# Patient Record
Sex: FEMALE | Race: BLACK OR AFRICAN AMERICAN | NOT HISPANIC OR LATINO
[De-identification: names, ages, dates, MRNs, and addresses within clinical notes are randomized per-mention and may not be internally consistent; named-entity substitution may affect disease eponyms.]

---

## 2017-04-14 ENCOUNTER — OTHER (OUTPATIENT)
Age: 42
End: 2017-04-14

## 2018-03-09 ENCOUNTER — TRANSCRIPTION ENCOUNTER (OUTPATIENT)
Age: 43
End: 2018-03-09

## 2020-05-10 ENCOUNTER — INPATIENT (INPATIENT)
Facility: HOSPITAL | Age: 45
LOS: 4 days | Discharge: HOME | DRG: 812 | End: 2020-05-15
Attending: FAMILY MEDICINE | Admitting: STUDENT IN AN ORGANIZED HEALTH CARE EDUCATION/TRAINING PROGRAM
Payer: COMMERCIAL

## 2020-05-10 VITALS
OXYGEN SATURATION: 97 % | RESPIRATION RATE: 18 BRPM | WEIGHT: 220.02 LBS | SYSTOLIC BLOOD PRESSURE: 159 MMHG | TEMPERATURE: 98 F | HEIGHT: 69 IN | DIASTOLIC BLOOD PRESSURE: 93 MMHG | HEART RATE: 89 BPM

## 2020-05-10 LAB
HCT VFR BLD CALC: 21.6 % — LOW (ref 34.5–45)
HGB BLD-MCNC: 7.5 G/DL — LOW (ref 11.5–15.5)
MCHC RBC-ENTMCNC: 32.9 PG — SIGNIFICANT CHANGE UP (ref 27–34)
MCHC RBC-ENTMCNC: 34.7 GM/DL — SIGNIFICANT CHANGE UP (ref 32–36)
MCV RBC AUTO: 94.7 FL — SIGNIFICANT CHANGE UP (ref 80–100)
PLATELET # BLD AUTO: 473 K/UL — HIGH (ref 150–400)
RBC # BLD: 2.28 M/UL — LOW (ref 3.8–5.2)
RBC # BLD: 2.28 M/UL — LOW (ref 3.8–5.2)
RBC # FLD: 21.8 % — HIGH (ref 10.3–14.5)
WBC # BLD: 16.33 K/UL — HIGH (ref 3.8–10.5)
WBC # FLD AUTO: 16.33 K/UL — HIGH (ref 3.8–10.5)

## 2020-05-10 RX ORDER — MORPHINE SULFATE 50 MG/1
4 CAPSULE, EXTENDED RELEASE ORAL ONCE
Refills: 0 | Status: DISCONTINUED | OUTPATIENT
Start: 2020-05-10 | End: 2020-05-10

## 2020-05-10 RX ORDER — ONDANSETRON 8 MG/1
4 TABLET, FILM COATED ORAL ONCE
Refills: 0 | Status: COMPLETED | OUTPATIENT
Start: 2020-05-10 | End: 2020-05-10

## 2020-05-10 RX ORDER — SODIUM CHLORIDE 9 MG/ML
1000 INJECTION INTRAMUSCULAR; INTRAVENOUS; SUBCUTANEOUS ONCE
Refills: 0 | Status: COMPLETED | OUTPATIENT
Start: 2020-05-10 | End: 2020-05-10

## 2020-05-10 RX ADMIN — ONDANSETRON 4 MILLIGRAM(S): 8 TABLET, FILM COATED ORAL at 23:48

## 2020-05-10 RX ADMIN — SODIUM CHLORIDE 1000 MILLILITER(S): 9 INJECTION INTRAMUSCULAR; INTRAVENOUS; SUBCUTANEOUS at 23:48

## 2020-05-10 RX ADMIN — MORPHINE SULFATE 4 MILLIGRAM(S): 50 CAPSULE, EXTENDED RELEASE ORAL at 23:48

## 2020-05-10 NOTE — ED PROVIDER NOTE - PMH
Cellulitis left arm resolved 9/2010    Cerebral Aneurysm    History of Cholecystectomy    Migraines    Sickle Cell Anemia

## 2020-05-10 NOTE — ED PROVIDER NOTE - OBJECTIVE STATEMENT
45yo female who presents with ss crisis. pt states it started tonite with back and rib pain, no cough, fever, chills, pt took 2 percocet at 8pm with no relief.

## 2020-05-11 DIAGNOSIS — Z90.49 ACQUIRED ABSENCE OF OTHER SPECIFIED PARTS OF DIGESTIVE TRACT: Chronic | ICD-10-CM

## 2020-05-11 DIAGNOSIS — M87.052 IDIOPATHIC ASEPTIC NECROSIS OF LEFT FEMUR: ICD-10-CM

## 2020-05-11 DIAGNOSIS — Z98.891 HISTORY OF UTERINE SCAR FROM PREVIOUS SURGERY: Chronic | ICD-10-CM

## 2020-05-11 DIAGNOSIS — D57.00 HB-SS DISEASE WITH CRISIS, UNSPECIFIED: ICD-10-CM

## 2020-05-11 DIAGNOSIS — I67.1 CEREBRAL ANEURYSM, NONRUPTURED: ICD-10-CM

## 2020-05-11 DIAGNOSIS — Z29.9 ENCOUNTER FOR PROPHYLACTIC MEASURES, UNSPECIFIED: ICD-10-CM

## 2020-05-11 DIAGNOSIS — G43.909 MIGRAINE, UNSPECIFIED, NOT INTRACTABLE, WITHOUT STATUS MIGRAINOSUS: ICD-10-CM

## 2020-05-11 DIAGNOSIS — I51.7 CARDIOMEGALY: ICD-10-CM

## 2020-05-11 LAB
ALBUMIN SERPL ELPH-MCNC: 3.7 G/DL — SIGNIFICANT CHANGE UP (ref 3.3–5)
ALBUMIN SERPL ELPH-MCNC: 3.8 G/DL — SIGNIFICANT CHANGE UP (ref 3.3–5)
ALP SERPL-CCNC: 88 U/L — SIGNIFICANT CHANGE UP (ref 40–120)
ALP SERPL-CCNC: 89 U/L — SIGNIFICANT CHANGE UP (ref 40–120)
ALT FLD-CCNC: 28 U/L — SIGNIFICANT CHANGE UP (ref 12–78)
ALT FLD-CCNC: 30 U/L — SIGNIFICANT CHANGE UP (ref 12–78)
ANION GAP SERPL CALC-SCNC: 7 MMOL/L — SIGNIFICANT CHANGE UP (ref 5–17)
ANION GAP SERPL CALC-SCNC: 7 MMOL/L — SIGNIFICANT CHANGE UP (ref 5–17)
ANISOCYTOSIS BLD QL: SIGNIFICANT CHANGE UP
AST SERPL-CCNC: 42 U/L — HIGH (ref 15–37)
AST SERPL-CCNC: 49 U/L — HIGH (ref 15–37)
BASOPHILS # BLD AUTO: 0 K/UL — SIGNIFICANT CHANGE UP (ref 0–0.2)
BASOPHILS # BLD AUTO: 0.16 K/UL — SIGNIFICANT CHANGE UP (ref 0–0.2)
BASOPHILS NFR BLD AUTO: 0 % — SIGNIFICANT CHANGE UP (ref 0–2)
BASOPHILS NFR BLD AUTO: 0.9 % — SIGNIFICANT CHANGE UP (ref 0–2)
BILIRUB SERPL-MCNC: 2.3 MG/DL — HIGH (ref 0.2–1.2)
BILIRUB SERPL-MCNC: 2.9 MG/DL — HIGH (ref 0.2–1.2)
BUN SERPL-MCNC: 5 MG/DL — LOW (ref 7–23)
BUN SERPL-MCNC: 6 MG/DL — LOW (ref 7–23)
CALCIUM SERPL-MCNC: 8.3 MG/DL — LOW (ref 8.5–10.1)
CALCIUM SERPL-MCNC: 8.5 MG/DL — SIGNIFICANT CHANGE UP (ref 8.5–10.1)
CHLORIDE SERPL-SCNC: 107 MMOL/L — SIGNIFICANT CHANGE UP (ref 96–108)
CHLORIDE SERPL-SCNC: 110 MMOL/L — HIGH (ref 96–108)
CO2 SERPL-SCNC: 24 MMOL/L — SIGNIFICANT CHANGE UP (ref 22–31)
CO2 SERPL-SCNC: 27 MMOL/L — SIGNIFICANT CHANGE UP (ref 22–31)
CREAT SERPL-MCNC: 0.64 MG/DL — SIGNIFICANT CHANGE UP (ref 0.5–1.3)
CREAT SERPL-MCNC: 0.84 MG/DL — SIGNIFICANT CHANGE UP (ref 0.5–1.3)
DACRYOCYTES BLD QL SMEAR: SLIGHT — SIGNIFICANT CHANGE UP
ELLIPTOCYTES BLD QL SMEAR: SLIGHT — SIGNIFICANT CHANGE UP
EOSINOPHIL # BLD AUTO: 0.02 K/UL — SIGNIFICANT CHANGE UP (ref 0–0.5)
EOSINOPHIL # BLD AUTO: 1.47 K/UL — HIGH (ref 0–0.5)
EOSINOPHIL NFR BLD AUTO: 0.1 % — SIGNIFICANT CHANGE UP (ref 0–6)
EOSINOPHIL NFR BLD AUTO: 9 % — HIGH (ref 0–6)
GLUCOSE SERPL-MCNC: 109 MG/DL — HIGH (ref 70–99)
GLUCOSE SERPL-MCNC: 135 MG/DL — HIGH (ref 70–99)
HCT VFR BLD CALC: 22.4 % — LOW (ref 34.5–45)
HGB BLD-MCNC: 7.9 G/DL — LOW (ref 11.5–15.5)
IMM GRANULOCYTES NFR BLD AUTO: 3.7 % — HIGH (ref 0–1.5)
LYMPHOCYTES # BLD AUTO: 12.8 % — LOW (ref 13–44)
LYMPHOCYTES # BLD AUTO: 2.25 K/UL — SIGNIFICANT CHANGE UP (ref 1–3.3)
LYMPHOCYTES # BLD AUTO: 43 % — SIGNIFICANT CHANGE UP (ref 13–44)
LYMPHOCYTES # BLD AUTO: 7.02 K/UL — HIGH (ref 1–3.3)
MACROCYTES BLD QL: SIGNIFICANT CHANGE UP
MAGNESIUM SERPL-MCNC: 2.2 MG/DL — SIGNIFICANT CHANGE UP (ref 1.6–2.6)
MANUAL SMEAR VERIFICATION: SIGNIFICANT CHANGE UP
MCHC RBC-ENTMCNC: 33.6 PG — SIGNIFICANT CHANGE UP (ref 27–34)
MCHC RBC-ENTMCNC: 35.3 GM/DL — SIGNIFICANT CHANGE UP (ref 32–36)
MCV RBC AUTO: 95.3 FL — SIGNIFICANT CHANGE UP (ref 80–100)
MICROCYTES BLD QL: SLIGHT — SIGNIFICANT CHANGE UP
MONOCYTES # BLD AUTO: 0.82 K/UL — SIGNIFICANT CHANGE UP (ref 0–0.9)
MONOCYTES # BLD AUTO: 0.82 K/UL — SIGNIFICANT CHANGE UP (ref 0–0.9)
MONOCYTES NFR BLD AUTO: 4.7 % — SIGNIFICANT CHANGE UP (ref 2–14)
MONOCYTES NFR BLD AUTO: 5 % — SIGNIFICANT CHANGE UP (ref 2–14)
NEUTROPHILS # BLD AUTO: 13.71 K/UL — HIGH (ref 1.8–7.4)
NEUTROPHILS # BLD AUTO: 6.21 K/UL — SIGNIFICANT CHANGE UP (ref 1.8–7.4)
NEUTROPHILS NFR BLD AUTO: 34 % — LOW (ref 43–77)
NEUTROPHILS NFR BLD AUTO: 77.8 % — HIGH (ref 43–77)
NEUTS BAND # BLD: 4 % — SIGNIFICANT CHANGE UP (ref 0–8)
NRBC # BLD: 0 — SIGNIFICANT CHANGE UP
NRBC # BLD: 1 /100 WBCS — HIGH (ref 0–0)
NRBC # BLD: SIGNIFICANT CHANGE UP /100 WBCS (ref 0–0)
PHOSPHATE SERPL-MCNC: 3.1 MG/DL — SIGNIFICANT CHANGE UP (ref 2.5–4.5)
PLAT MORPH BLD: NORMAL — SIGNIFICANT CHANGE UP
PLATELET # BLD AUTO: 447 K/UL — HIGH (ref 150–400)
POIKILOCYTOSIS BLD QL AUTO: SIGNIFICANT CHANGE UP
POLYCHROMASIA BLD QL SMEAR: SLIGHT — SIGNIFICANT CHANGE UP
POTASSIUM SERPL-MCNC: 3.5 MMOL/L — SIGNIFICANT CHANGE UP (ref 3.5–5.3)
POTASSIUM SERPL-MCNC: 3.8 MMOL/L — SIGNIFICANT CHANGE UP (ref 3.5–5.3)
POTASSIUM SERPL-SCNC: 3.5 MMOL/L — SIGNIFICANT CHANGE UP (ref 3.5–5.3)
POTASSIUM SERPL-SCNC: 3.8 MMOL/L — SIGNIFICANT CHANGE UP (ref 3.5–5.3)
PROT SERPL-MCNC: 7.7 G/DL — SIGNIFICANT CHANGE UP (ref 6–8.3)
PROT SERPL-MCNC: 7.9 G/DL — SIGNIFICANT CHANGE UP (ref 6–8.3)
RBC # BLD: 2.35 M/UL — LOW (ref 3.8–5.2)
RBC # FLD: 21.1 % — HIGH (ref 10.3–14.5)
RBC BLD AUTO: ABNORMAL
RETICS #: 340.9 K/UL — HIGH (ref 25–125)
RETICS/RBC NFR: 15 % — HIGH (ref 0.5–2.5)
SARS-COV-2 RNA SPEC QL NAA+PROBE: SIGNIFICANT CHANGE UP
SCHISTOCYTES BLD QL AUTO: SLIGHT — SIGNIFICANT CHANGE UP
SICKLE CELLS BLD QL SMEAR: SIGNIFICANT CHANGE UP
SODIUM SERPL-SCNC: 141 MMOL/L — SIGNIFICANT CHANGE UP (ref 135–145)
SODIUM SERPL-SCNC: 141 MMOL/L — SIGNIFICANT CHANGE UP (ref 135–145)
TARGETS BLD QL SMEAR: SLIGHT — SIGNIFICANT CHANGE UP
VARIANT LYMPHS # BLD: 5 % — SIGNIFICANT CHANGE UP (ref 0–6)
WBC # BLD: 17.62 K/UL — HIGH (ref 3.8–10.5)
WBC # FLD AUTO: 17.62 K/UL — HIGH (ref 3.8–10.5)

## 2020-05-11 PROCEDURE — 71045 X-RAY EXAM CHEST 1 VIEW: CPT | Mod: 26

## 2020-05-11 PROCEDURE — 99285 EMERGENCY DEPT VISIT HI MDM: CPT

## 2020-05-11 PROCEDURE — 99222 1ST HOSP IP/OBS MODERATE 55: CPT | Mod: GC,AI

## 2020-05-11 PROCEDURE — 93010 ELECTROCARDIOGRAM REPORT: CPT

## 2020-05-11 PROCEDURE — 12345: CPT | Mod: NC

## 2020-05-11 RX ORDER — SENNA PLUS 8.6 MG/1
2 TABLET ORAL AT BEDTIME
Refills: 0 | Status: DISCONTINUED | OUTPATIENT
Start: 2020-05-11 | End: 2020-05-15

## 2020-05-11 RX ORDER — SODIUM CHLORIDE 9 MG/ML
1000 INJECTION, SOLUTION INTRAVENOUS
Refills: 0 | Status: DISCONTINUED | OUTPATIENT
Start: 2020-05-11 | End: 2020-05-14

## 2020-05-11 RX ORDER — HYDROMORPHONE HYDROCHLORIDE 2 MG/ML
2 INJECTION INTRAMUSCULAR; INTRAVENOUS; SUBCUTANEOUS EVERY 4 HOURS
Refills: 0 | Status: DISCONTINUED | OUTPATIENT
Start: 2020-05-11 | End: 2020-05-15

## 2020-05-11 RX ORDER — HYDROMORPHONE HYDROCHLORIDE 2 MG/ML
1 INJECTION INTRAMUSCULAR; INTRAVENOUS; SUBCUTANEOUS EVERY 4 HOURS
Refills: 0 | Status: DISCONTINUED | OUTPATIENT
Start: 2020-05-11 | End: 2020-05-14

## 2020-05-11 RX ORDER — OXYCODONE AND ACETAMINOPHEN 5; 325 MG/1; MG/1
1 TABLET ORAL EVERY 6 HOURS
Refills: 0 | Status: DISCONTINUED | OUTPATIENT
Start: 2020-05-11 | End: 2020-05-14

## 2020-05-11 RX ORDER — HYDROMORPHONE HYDROCHLORIDE 2 MG/ML
0.5 INJECTION INTRAMUSCULAR; INTRAVENOUS; SUBCUTANEOUS ONCE
Refills: 0 | Status: DISCONTINUED | OUTPATIENT
Start: 2020-05-11 | End: 2020-05-11

## 2020-05-11 RX ORDER — POLYETHYLENE GLYCOL 3350 17 G/17G
17 POWDER, FOR SOLUTION ORAL DAILY
Refills: 0 | Status: DISCONTINUED | OUTPATIENT
Start: 2020-05-11 | End: 2020-05-15

## 2020-05-11 RX ORDER — GABAPENTIN 400 MG/1
300 CAPSULE ORAL THREE TIMES A DAY
Refills: 0 | Status: DISCONTINUED | OUTPATIENT
Start: 2020-05-11 | End: 2020-05-15

## 2020-05-11 RX ORDER — ONDANSETRON 8 MG/1
4 TABLET, FILM COATED ORAL EVERY 6 HOURS
Refills: 0 | Status: DISCONTINUED | OUTPATIENT
Start: 2020-05-11 | End: 2020-05-15

## 2020-05-11 RX ORDER — HYDROMORPHONE HYDROCHLORIDE 2 MG/ML
0.5 INJECTION INTRAMUSCULAR; INTRAVENOUS; SUBCUTANEOUS ONCE
Refills: 0 | Status: COMPLETED | OUTPATIENT
Start: 2020-05-11 | End: 2020-05-11

## 2020-05-11 RX ORDER — TRAMADOL HYDROCHLORIDE 50 MG/1
25 TABLET ORAL EVERY 6 HOURS
Refills: 0 | Status: DISCONTINUED | OUTPATIENT
Start: 2020-05-11 | End: 2020-05-14

## 2020-05-11 RX ORDER — SODIUM CHLORIDE 9 MG/ML
1000 INJECTION INTRAMUSCULAR; INTRAVENOUS; SUBCUTANEOUS
Refills: 0 | Status: DISCONTINUED | OUTPATIENT
Start: 2020-05-11 | End: 2020-05-11

## 2020-05-11 RX ORDER — ENOXAPARIN SODIUM 100 MG/ML
40 INJECTION SUBCUTANEOUS DAILY
Refills: 0 | Status: DISCONTINUED | OUTPATIENT
Start: 2020-05-11 | End: 2020-05-15

## 2020-05-11 RX ORDER — GABAPENTIN 400 MG/1
1 CAPSULE ORAL
Qty: 0 | Refills: 0 | DISCHARGE

## 2020-05-11 RX ADMIN — HYDROMORPHONE HYDROCHLORIDE 1 MILLIGRAM(S): 2 INJECTION INTRAMUSCULAR; INTRAVENOUS; SUBCUTANEOUS at 10:31

## 2020-05-11 RX ADMIN — SODIUM CHLORIDE 100 MILLILITER(S): 9 INJECTION, SOLUTION INTRAVENOUS at 19:55

## 2020-05-11 RX ADMIN — SODIUM CHLORIDE 100 MILLILITER(S): 9 INJECTION, SOLUTION INTRAVENOUS at 06:20

## 2020-05-11 RX ADMIN — HYDROMORPHONE HYDROCHLORIDE 0.5 MILLIGRAM(S): 2 INJECTION INTRAMUSCULAR; INTRAVENOUS; SUBCUTANEOUS at 00:30

## 2020-05-11 RX ADMIN — TRAMADOL HYDROCHLORIDE 25 MILLIGRAM(S): 50 TABLET ORAL at 06:18

## 2020-05-11 RX ADMIN — HYDROMORPHONE HYDROCHLORIDE 2 MILLIGRAM(S): 2 INJECTION INTRAMUSCULAR; INTRAVENOUS; SUBCUTANEOUS at 20:43

## 2020-05-11 RX ADMIN — HYDROMORPHONE HYDROCHLORIDE 0.5 MILLIGRAM(S): 2 INJECTION INTRAMUSCULAR; INTRAVENOUS; SUBCUTANEOUS at 03:13

## 2020-05-11 RX ADMIN — GABAPENTIN 300 MILLIGRAM(S): 400 CAPSULE ORAL at 06:18

## 2020-05-11 RX ADMIN — TRAMADOL HYDROCHLORIDE 25 MILLIGRAM(S): 50 TABLET ORAL at 17:25

## 2020-05-11 RX ADMIN — TRAMADOL HYDROCHLORIDE 25 MILLIGRAM(S): 50 TABLET ORAL at 13:46

## 2020-05-11 RX ADMIN — HYDROMORPHONE HYDROCHLORIDE 2 MILLIGRAM(S): 2 INJECTION INTRAMUSCULAR; INTRAVENOUS; SUBCUTANEOUS at 15:41

## 2020-05-11 RX ADMIN — SENNA PLUS 2 TABLET(S): 8.6 TABLET ORAL at 21:18

## 2020-05-11 RX ADMIN — SODIUM CHLORIDE 100 MILLILITER(S): 9 INJECTION, SOLUTION INTRAVENOUS at 03:56

## 2020-05-11 RX ADMIN — GABAPENTIN 300 MILLIGRAM(S): 400 CAPSULE ORAL at 14:58

## 2020-05-11 RX ADMIN — OXYCODONE AND ACETAMINOPHEN 1 TABLET(S): 5; 325 TABLET ORAL at 06:20

## 2020-05-11 RX ADMIN — GABAPENTIN 300 MILLIGRAM(S): 400 CAPSULE ORAL at 21:18

## 2020-05-11 RX ADMIN — SODIUM CHLORIDE 1000 MILLILITER(S): 9 INJECTION INTRAMUSCULAR; INTRAVENOUS; SUBCUTANEOUS at 03:55

## 2020-05-11 RX ADMIN — SODIUM CHLORIDE 100 MILLILITER(S): 9 INJECTION, SOLUTION INTRAVENOUS at 15:41

## 2020-05-11 RX ADMIN — SODIUM CHLORIDE 75 MILLILITER(S): 9 INJECTION INTRAMUSCULAR; INTRAVENOUS; SUBCUTANEOUS at 02:04

## 2020-05-11 RX ADMIN — HYDROMORPHONE HYDROCHLORIDE 0.5 MILLIGRAM(S): 2 INJECTION INTRAMUSCULAR; INTRAVENOUS; SUBCUTANEOUS at 02:00

## 2020-05-11 NOTE — H&P ADULT - HISTORY OF PRESENT ILLNESS
Patient is a 44/F with PMHx significant for Sickle cell disease (last known crisis 2013), cerebral aneurysm, migraine d/o who presented to the ED with sudden onset of back and rib pain at approx 8pm on the day of admission. Patient had attempted 2x10-325 percocet for pain relief without any improvement in symptoms. ...................................... She denies any associated chest pain, SOB, n/v/d, abdominal pain.     ED Vitals:   T 98.5 HR 89 /93 RR 18 SpO2 97% on RA  Labs: Leukocytosis WBC 16.33 with lymphocytic predominance, anemic Hb 7.5 (Baseline ________________), reticulocyte % 15.0. CMP wnl. COVID 19 PCR pending  CXR: lungs clear, lateral haziness likely overlying subq fat, enlarged heart despite AP film, when compared to previous AP film 2013. ([facility] read)  EKG:   In the ED Patient received 1L Ns bolus, dilaudid 0.5mg x 1, morphine 4mg x1, zofran 4mg x 1 Patient is a 44/F with PMHx significant for Sickle cell disease (last known crisis September 2019), cerebral aneurysm, migraine, and avascular necrosis who presented to the ED with sudden onset of back and rib pain at approximately 7pm on the day of admission. Patient had attempted to take percocet for pain relief at 8:30 without any improvement in symptoms. Pt has percocet prescribed for avascular necrosis and she normally takes is BID. Pt states this is how her sickle cell crisis always presents. States her current pain is 10/10. Admits to sickle cell crisis about twice a year that requires hospitalization. States her baseline hemoglobin is around 8 and she needs a transfusion about once a year for hemoglobin <7. She denies any associated chest pain, SOB, n/v/d, abdominal pain, changes in vision, weakness, sick contacts, recent travel. Admits to a little bit of a headache.    ED Vitals:   T 98.5 HR 89 /93 RR 18 SpO2 97% on RA  Labs: Leukocytosis WBC 16.33 with lymphocytic predominance, anemic Hb 7.5 (Baseline 8), reticulocyte % 15.0. CMP wnl. COVID 19 PCR pending  CXR: lungs clear, lateral haziness likely overlying subq fat, enlarged heart despite AP film, when compared to previous AP film 2013. ([facility] read)  EKG:   In the ED Patient received 1L Ns bolus, dilaudid 0.5mg x 1, morphine 4mg x1, zofran 4mg x 1 Patient is a 44/F with PMHx significant for Sickle cell disease (last known crisis September 2019), cerebral aneurysm, migraine, and avascular necrosis who presented to the ED with sudden onset of back and rib pain at approximately 7pm on the day of admission. Patient had attempted to take percocet for pain relief at 8:30 without any improvement in symptoms. Pt has percocet prescribed for avascular necrosis and she normally takes is BID. Pt states this is how her sickle cell crisis always presents. States her current pain is 10/10. Admits to sickle cell crisis about twice a year that requires hospitalization. States her baseline hemoglobin is around 8 and she needs a transfusion about once a year for hemoglobin <7. She denies any associated chest pain, SOB, n/v/d, abdominal pain, changes in vision, weakness, sick contacts, recent travel. Admits to a little bit of a headache.    ED Vitals:   T 98.5 HR 89 /93 RR 18 SpO2 97% on RA  Labs: Leukocytosis WBC 16.33 with lymphocytic predominance, anemic Hb 7.5 (Baseline 8), reticulocyte % 15.0. CMP wnl. COVID 19 PCR pending  CXR: lungs clear, lateral haziness likely overlying subq fat, enlarged heart despite AP film, when compared to previous AP film 2013. ([facility] read)  EKG: prelim NSR with borderline LVH critera  In the ED Patient received 1L Ns bolus, dilaudid 0.5mg x 1, morphine 4mg x1, zofran 4mg x 1

## 2020-05-11 NOTE — H&P ADULT - PROBLEM SELECTOR PLAN 5
DVT Proph: Lovenox  IMPROVE VTE Individual Risk Assessment  RISK                                                                Points  [  ] Previous VTE                                                  3  [  ] Thrombophilia                                               2  [  ] Lower limb paralysis                                      2        (unable to hold up >15 seconds)    [  ] Current Cancer                                              2         (within 6 months)  [  ] Immobilization > 24 hrs                                1  [  ] ICU/CCU stay > 24 hours                              1  [  ] Age > 60                                                      1  IMPROVE VTE Score __0_______    IMPROVE Score 0-1: Low Risk, No VTE prophylaxis required for most patients, encourage ambulation.   IMPROVE Score 2-3: At risk, pharmacologic VTE prophylaxis is indicated for most patients (in the absence of a contraindication)  IMPROVE Score > or = 4: High Risk, pharmacologic VTE prophylaxis is indicated for most patients (in the absence of a contraindication) - pt with h/o cerebral aneurysm

## 2020-05-11 NOTE — H&P ADULT - ASSESSMENT
Patient is a 44/F with PMHx significant for Sickle cell disease (last known crisis 2013), cerebral aneurysm, migraine d/o who presented to the ED with sudden onset of back and rib pain at approx 8pm on the day of admission, found to be in sickle cell crisis, admit to F for further management, pain relief

## 2020-05-11 NOTE — H&P ADULT - NSICDXPASTMEDICALHX_GEN_ALL_CORE_FT
PAST MEDICAL HISTORY:  Cellulitis left arm resolved 9/2010     Cerebral Aneurysm     History of Cholecystectomy     Migraines     Sickle Cell Anemia PAST MEDICAL HISTORY:  Avascular necrosis left hip    Cellulitis left arm resolved 9/2010     Cerebral Aneurysm     Migraines     Sickle Cell Anemia

## 2020-05-11 NOTE — H&P ADULT - PROBLEM SELECTOR PLAN 6
DVT Proph: Lovenox  IMPROVE VTE Individual Risk Assessment  RISK                                                                Points  [  ] Previous VTE                                                  3  [  ] Thrombophilia                                               2  [  ] Lower limb paralysis                                      2        (unable to hold up >15 seconds)    [  ] Current Cancer                                              2         (within 6 months)  [  ] Immobilization > 24 hrs                                1  [  ] ICU/CCU stay > 24 hours                              1  [  ] Age > 60                                                      1  IMPROVE VTE Score __0_______    IMPROVE Score 0-1: Low Risk, No VTE prophylaxis required for most patients, encourage ambulation.   IMPROVE Score 2-3: At risk, pharmacologic VTE prophylaxis is indicated for most patients (in the absence of a contraindication)  IMPROVE Score > or = 4: High Risk, pharmacologic VTE prophylaxis is indicated for most patients (in the absence of a contraindication)

## 2020-05-11 NOTE — H&P ADULT - NSHPREVIEWOFSYSTEMS_GEN_ALL_CORE
Constitutional: denies fever, chills, diaphoresis   HEENT: denies blurry vision, difficulty hearing  Respiratory: denies SOB, DIAZ, cough, sputum production, wheezing, hemoptysis  Cardiovascular: denies chest pain, palpitations, edema  Gastrointestinal: denies nausea, vomiting, diarrhea, constipation, abdominal pain, melena, hematochezia   Genitourinary: denies dysuria, frequency, urgency, hematuria   Skin/Breast: denies rash, itching  Musculoskeletal: denies myalgias, joint swelling, muscle weakness  Neurologic: denies headache, weakness, dizziness, paresthesias, numbness/tingling  Psychiatric: denies feeling anxious, depressed, suicidal, homicidal thoughts  Hematology/Oncology: denies bruising, tender or enlarged lymph nodes   ROS negative except as noted above Constitutional: denies fever, chills, diaphoresis   HEENT: denies blurry vision, difficulty hearing  Respiratory: denies SOB, DIAZ, cough, sputum production, wheezing, hemoptysis  Cardiovascular: denies chest pain, palpitations, edema  Gastrointestinal: denies nausea, vomiting, diarrhea, constipation, abdominal pain, melena, hematochezia   Genitourinary: denies dysuria, frequency, urgency, hematuria, admits to currently menstruating    Skin/Breast: denies rash, itching  Musculoskeletal: admits b/l rib pain and lower back pian, denies LE pain  Neurologic" admits headache, denies weakness, dizziness, paresthesias, numbness/tingling Constitutional: denies fever, chills, diaphoresis   HEENT: denies blurry vision, difficulty hearing  Respiratory: denies SOB, DIAZ, cough, sputum production, wheezing, hemoptysis  Cardiovascular: denies chest pain, palpitations, edema  Gastrointestinal: denies nausea, vomiting, diarrhea, constipation, abdominal pain, melena, hematochezia   Genitourinary: denies dysuria, frequency, urgency, hematuria, admits to currently menstruating    Skin/Breast: denies rash, itching  Musculoskeletal: admits b/l rib pain and lower back pian, denies LE pain  Neurologic: admits headache, denies weakness, dizziness, paresthesias, numbness/tingling

## 2020-05-11 NOTE — H&P ADULT - NSHPPHYSICALEXAM_GEN_ALL_CORE
Vital Signs Last 24 Hrs  T(C): 36.9 (10 May 2020 23:21), Max: 36.9 (10 May 2020 23:21)  T(F): 98.5 (10 May 2020 23:21), Max: 98.5 (10 May 2020 23:21)  HR: 89 (10 May 2020 23:21) (89 - 89)  BP: 159/93 (10 May 2020 23:21) (159/93 - 159/93)  BP(mean): --  RR: 18 (10 May 2020 23:21) (18 - 18)  SpO2: 97% (10 May 2020 23:21) (97% - 97%) T(C): 36.8 (05-11-20 @ 01:23), Max: 36.9 (05-10-20 @ 23:21)  HR: 88 (05-11-20 @ 01:23) (88 - 89)  BP: 144/84 (05-11-20 @ 01:23) (144/84 - 159/93)  RR: 16 (05-11-20 @ 01:23) (16 - 18)  SpO2: 97% (05-11-20 @ 01:23) (97% - 97%)    GENERAL: patient appears uncomfortable, in pain, appropriate  EYES: sclera clear, no exudates  LUNGS: good air entry bilaterally, clear to auscultation, symmetric breath sounds, no wheezing or rhonchi appreciated  HEART: soft S1/S2, regular rate and rhythm, no murmurs noted, no lower extremity edema  GASTROINTESTINAL: abdomen is soft, nontender, nondistended, normoactive bowel sounds, no palpable masses  INTEGUMENT: good skin turgor, warm skin, appears well perfused  MUSCULOSKELETAL: no clubbing or cyanosis, no obvious deformity  NEUROLOGIC: awake, alert, oriented x3, no obvious sensory deficits  PSYCHIATRIC: mood is good, affect is congruent, linear and logical thought process T(C): 36.8 (05-11-20 @ 01:23), Max: 36.9 (05-10-20 @ 23:21)  HR: 88 (05-11-20 @ 01:23) (88 - 89)  BP: 144/84 (05-11-20 @ 01:23) (144/84 - 159/93)  RR: 16 (05-11-20 @ 01:23) (16 - 18)  SpO2: 97% (05-11-20 @ 01:23) (97% - 97%)    GENERAL: patient appears in distress due to pain  EYES: sclera clear, no exudates  LUNGS: good air entry bilaterally, clear to auscultation, symmetric breath sounds, no wheezing or rhonchi appreciated  HEART: soft S1/S2, regular rate and rhythm, no murmurs noted, no lower extremity edema  GASTROINTESTINAL: abdomen is soft, nontender, nondistended, normoactive bowel sounds, no palpable masses  INTEGUMENT: good skin turgor, warm skin, appears well perfused  MUSCULOSKELETAL: no clubbing or cyanosis, no obvious deformity  NEUROLOGIC: awake, alert, oriented x3, no obvious sensory deficits  PSYCHIATRIC: mood is good, affect is congruent, linear and logical thought process

## 2020-05-11 NOTE — ED ADULT NURSE REASSESSMENT NOTE - NS ED NURSE REASSESS COMMENT FT1
pt admitted to floor vital signs stable awaiting bed assignment and covid result pending
pt reavaluated and c/o pain scale 9 and 10 pt nedicated with dilaudid 0,5 mg ivp as per md order vital signs stable
pt remains in er sleeping at present admitted to hospital awaiting bed assignment
pt verbalizes no relief from pain pt reavaluated and medicated with  dilaudid  0.5 mg ivp andcovid text sent to lab pt admitted to floor awaiting
pt with c/o of pain with no relief MD aware and pt medicated with percocet tramadol and nuerontin as ordered still awaiting covd result  for bed assignment  vital signs stable ivf in progress will continue to monitor

## 2020-05-11 NOTE — PROGRESS NOTE ADULT - SUBJECTIVE AND OBJECTIVE BOX
CHIEF COMPLAINT/INTERVAL HISTORY:  Pt. seen and evaluated for sickle cell crisis.  Pt. reports having left sided rib cage and back pain.  Denies having any SOB.  Receiving 1/2NS IV hydration and analgesic pain medications.     REVIEW OF SYSTEMS:  No fever, SOB, or abdominal pain.     Vital Signs Last 24 Hrs  T(C): 37.1 (11 May 2020 06:00), Max: 37.1 (11 May 2020 06:00)  T(F): 98.7 (11 May 2020 06:00), Max: 98.7 (11 May 2020 06:00)  HR: 80 (11 May 2020 06:00) (80 - 89)  BP: 153/75 (11 May 2020 06:00) (141/84 - 159/93)  BP(mean): --  RR: 16 (11 May 2020 06:00) (16 - 18)  SpO2: 97% (11 May 2020 06:00) (97% - 100%)    PHYSICAL EXAM:  GENERAL: mild distress 2/2 pain  HEENT: EOMI, hearing normal, conjunctiva and sclera clear  Chest: CTA bilaterally, no wheezing  CV: S1S2, RRR,   GI: soft, +BS, NT/ND  Musculoskeletal: no edema  Psychiatric: affect nL, mood nL  Skin: warm and dry    LABS:                        7.9    17.62 )-----------( 447      ( 11 May 2020 07:05 )             22.4     05-11    141  |  110<H>  |  5<L>  ----------------------------<  135<H>  3.8   |  24  |  0.64    Ca    8.3<L>      11 May 2020 07:05  Phos  3.1     05-11  Mg     2.2     05-11    TPro  7.9  /  Alb  3.8  /  TBili  2.3<H>  /  DBili  x   /  AST  49<H>  /  ALT  28  /  AlkPhos  89  05-11      Assessment and Plan:  -Sickle cell crisis:  continue analgesics and 1/2NS@100cc/hr.  Hematology consult.   -Cardiomegaly:  Check echo.    -Hx of AVN of left hip:  Continue Neurontin 300mg PO TID and rest of pain medications   -Hx of Migraine HA and Cerebral aneurysm:  Currently no headache.  Will continue to monitor.   -VTE ppx:  Lovenox 40mg SQ daily

## 2020-05-11 NOTE — ED ADULT NURSE NOTE - OBJECTIVE STATEMENT
received pt stable c/o cp with sickle cell crisis as per pt pt evaluated and placed on cardiac monitor  blood work drawned and sent to lab and medicated with morphine and zofran ivp as ordered

## 2020-05-11 NOTE — H&P ADULT - PROBLEM SELECTOR PLAN 2
- pt with h/o of avascular necrosis   - takes percocet 2 times daily for pain -- will hold inpatient and continue with pain control as stated above  - c/w home gabapentin - CXR: lungs clear, lateral haziness likely overlying subq fat, enlarged heart despite AP film, when compared to previous AP film 2013. ([facility] read) -- follow up official read  - EKG: prelim NSR with borderline LVH criteria (>34yo and R aVL greater than or equal to 12)  - Check TTE for further evaluation

## 2020-05-11 NOTE — H&P ADULT - NSHPSOCIALHISTORY_GEN_ALL_CORE
tobacco:  EtOH:  Recreational drug use:  Lives with:  Occupation:  Ambulates:  ADLs:  Vaccinations:  -Flu:  -PNA:  -Shingles:  Recent travel: tobacco: denies  EtOH: denies  Recreational drug use: denies  Lives with: daughter  Occupation: Medical assistant  Ambulates: independently  ADLs: independent   Vaccinations: UTD  -Flu: vaccianted  Recent travel: denies

## 2020-05-11 NOTE — H&P ADULT - PROBLEM SELECTOR PLAN 1
- pt with h/o sickle cell anemia with crisis about twice a year  - reticulocyte count 15%  - now with 10/10 pain s/p morphine 4mg and Dilaudid 0.5 in ED  - current hemoglobin 7.5, per patient baseline hemoglobin around 8  - transfuse for hemogloblin <7 or if develops symptomatic anemia   - CxR: per [facility] read enlarged heart noted; official read pending  - pain control with morphine 2mg mild, Dilaudid 0.5mg moderate, Dilaudid 1mg severs; 0.5mg Dilaudid for breakthrough pain  - /93 on admission: likely 2/2 crisis and pain -- will monitor  - s/p 1L NS bolus; started on 73cc/hr NS x12 hrs  - Diet: regular  - c/w Lovenox for DVT prophylaxis  - f/u am cbc  - f/u LDH and ferritin - pt with h/o sickle cell anemia with crisis about twice a year  - reticulocyte count 15%  - now with 10/10 pain s/p morphine 4mg and Dilaudid 0.5 in ED  - current hemoglobin 7.5, per patient baseline hemoglobin around 8  - transfuse for hemogloblin <7 or if develops symptomatic anemia   - CxR: per [facility] read enlarged heart noted; official read pending  - pain control with morphine 2mg mild, Dilaudid 0.5mg moderate, Dilaudid 1mg severs; 0.5mg Dilaudid for breakthrough pain  - /93 on admission: likely 2/2 crisis and pain -- will monitor  - s/p 1L NS bolus; started on 73cc/hr NS x12 hrs  - Diet: regular  - c/w Lovenox for DVT prophylaxis  - f/u am cbc  - f/u LDH and ferritin  - of note states her Hematologist Dr. Huffman told her she did not need hydroxyurea - pt with h/o sickle cell anemia with crisis about twice a year  - reticulocyte count 15%  - now with 10/10 pain s/p morphine 4mg and Dilaudid 0.5 in ED  - current hemoglobin 7.5, per patient baseline hemoglobin around 8  - transfuse for hemogloblin <7 or if develops symptomatic anemia   - CxR: per [facility] read enlarged heart noted; official read pending  - pain control with morphine 2mg mild, Dilaudid 0.5mg moderate, Dilaudid 1mg severs; 0.5mg Dilaudid for breakthrough pain  - c/w warm compresses as needed  - /93 on admission: likely 2/2 crisis and pain -- will monitor  - s/p 1L NS bolus; started on 73cc/hr NS x12 hrs  - Diet: regular  - c/w Lovenox for DVT prophylaxis  - f/u am cbc  - f/u LDH and ferritin  - of note states her Hematologist Dr. Huffman told her she did not need hydroxyurea - pt with h/o sickle cell anemia with crisis about twice a year  - reticulocyte count 15%  - WBC 16.33 likely reactive  - now with 10/10 pain s/p morphine 4mg and Dilaudid 0.5 in ED  - current hemoglobin 7.5, per patient baseline hemoglobin around 8  - transfuse for hemogloblin <7 or if develops symptomatic anemia   - CxR: per [facility] read enlarged heart noted; official read pending; type and screen done  - pain control with percocet 5-325 mild, Dilaudid 1mg moderate, Dilaudid 2mg severe  - c/w warm compresses as needed  - /93 on admission: likely 2/2 crisis and pain -- will monitor  - s/p 1L NS bolus; c/w on 100cc/hr 1/2NS x12 hrs  - incentive spirometer ordered   - Diet: regular  - c/w Lovenox for DVT prophylaxis  - f/u am cbc  - f/u LDH and ferritin  - of note states her Hematologist Dr. Huffman told her she did not need hydroxyurea  - Heme/onc Dr. Gibbs consulted - pt with h/o sickle cell anemia with crisis about twice a year  - reticulocyte count 15%  - WBC 16.33 likely reactive  - now with 10/10 pain s/p morphine 4mg and Dilaudid 0.5 in ED  - current hemoglobin 7.5, per patient baseline hemoglobin around 8  - transfuse for hemogloblin <7 or if develops symptomatic anemia   - CxR: per [facility] read enlarged heart noted; official read pending; type and screen done  - pain control with percocet 5-325 mild, Dilaudid 1mg moderate, Dilaudid 2mg severe  - c/w warm compresses as needed  - Zofran prn  - /93 on admission: likely 2/2 crisis and pain -- will monitor  - s/p 1L NS bolus; c/w on 100cc/hr 1/2NS x12 hrs  - incentive spirometer ordered   - Diet: regular  - c/w Lovenox for DVT prophylaxis  - f/u am cbc  - f/u LDH and ferritin  - of note states her Hematologist Dr. Huffman told her she did not need hydroxyurea  - Heme/onc Dr. Gibbs consulted

## 2020-05-11 NOTE — H&P ADULT - PROBLEM SELECTOR PLAN 3
- chronic, stable  - not on home meds - pt with h/o of avascular necrosis   - takes percocet 2 times daily for pain(istop per pharmacy) -- will hold inpatient and continue with pain control as stated above  - c/w home gabapentin - pt with h/o of avascular necrosis   - takes percocet 2 times daily for pain(istop per pharmacy) -- will hold and continue with pain control as stated above  - c/w home gabapentin 300mg TID

## 2020-05-11 NOTE — ED ADULT NURSE NOTE - NSIMPLEMENTINTERV_GEN_ALL_ED
Implemented All Fall with Harm Risk Interventions:  [place] to call system. Call bell, personal items and telephone within reach. Instruct patient to call for assistance. Room bathroom lighting operational. Non-slip footwear when patient is off stretcher. Physically safe environment: no spills, clutter or unnecessary equipment. Stretcher in lowest position, wheels locked, appropriate side rails in place. Provide visual cue, wrist band, yellow gown, etc. Monitor gait and stability. Monitor for mental status changes and reorient to person, place, and time. Review medications for side effects contributing to fall risk. Reinforce activity limits and safety measures with patient and family. Provide visual clues: red socks.

## 2020-05-12 ENCOUNTER — TRANSCRIPTION ENCOUNTER (OUTPATIENT)
Age: 45
End: 2020-05-12

## 2020-05-12 LAB
ALBUMIN SERPL ELPH-MCNC: 3.5 G/DL — SIGNIFICANT CHANGE UP (ref 3.3–5)
ALP SERPL-CCNC: 96 U/L — SIGNIFICANT CHANGE UP (ref 40–120)
ALT FLD-CCNC: 23 U/L — SIGNIFICANT CHANGE UP (ref 12–78)
ANION GAP SERPL CALC-SCNC: 6 MMOL/L — SIGNIFICANT CHANGE UP (ref 5–17)
AST SERPL-CCNC: 46 U/L — HIGH (ref 15–37)
BASOPHILS # BLD AUTO: 0.08 K/UL — SIGNIFICANT CHANGE UP (ref 0–0.2)
BASOPHILS NFR BLD AUTO: 0.6 % — SIGNIFICANT CHANGE UP (ref 0–2)
BILIRUB SERPL-MCNC: 4.4 MG/DL — HIGH (ref 0.2–1.2)
BUN SERPL-MCNC: 4 MG/DL — LOW (ref 7–23)
CALCIUM SERPL-MCNC: 8.3 MG/DL — LOW (ref 8.5–10.1)
CHLORIDE SERPL-SCNC: 104 MMOL/L — SIGNIFICANT CHANGE UP (ref 96–108)
CO2 SERPL-SCNC: 29 MMOL/L — SIGNIFICANT CHANGE UP (ref 22–31)
CREAT SERPL-MCNC: 0.57 MG/DL — SIGNIFICANT CHANGE UP (ref 0.5–1.3)
EOSINOPHIL # BLD AUTO: 0.17 K/UL — SIGNIFICANT CHANGE UP (ref 0–0.5)
EOSINOPHIL NFR BLD AUTO: 1.2 % — SIGNIFICANT CHANGE UP (ref 0–6)
FERRITIN SERPL-MCNC: 472 NG/ML — HIGH (ref 15–150)
GLUCOSE SERPL-MCNC: 117 MG/DL — HIGH (ref 70–99)
HAPTOGLOB SERPL-MCNC: <20 MG/DL — LOW (ref 34–200)
HCT VFR BLD CALC: 19.5 % — CRITICAL LOW (ref 34.5–45)
HGB BLD-MCNC: 7 G/DL — CRITICAL LOW (ref 11.5–15.5)
IMM GRANULOCYTES NFR BLD AUTO: 1.1 % — SIGNIFICANT CHANGE UP (ref 0–1.5)
LDH SERPL L TO P-CCNC: 702 U/L — HIGH (ref 50–242)
LYMPHOCYTES # BLD AUTO: 23.8 % — SIGNIFICANT CHANGE UP (ref 13–44)
LYMPHOCYTES # BLD AUTO: 3.45 K/UL — HIGH (ref 1–3.3)
MAGNESIUM SERPL-MCNC: 2.3 MG/DL — SIGNIFICANT CHANGE UP (ref 1.6–2.6)
MCHC RBC-ENTMCNC: 33.8 PG — SIGNIFICANT CHANGE UP (ref 27–34)
MCHC RBC-ENTMCNC: 35.9 GM/DL — SIGNIFICANT CHANGE UP (ref 32–36)
MCV RBC AUTO: 94.2 FL — SIGNIFICANT CHANGE UP (ref 80–100)
MONOCYTES # BLD AUTO: 1.38 K/UL — HIGH (ref 0–0.9)
MONOCYTES NFR BLD AUTO: 9.5 % — SIGNIFICANT CHANGE UP (ref 2–14)
NEUTROPHILS # BLD AUTO: 9.25 K/UL — HIGH (ref 1.8–7.4)
NEUTROPHILS NFR BLD AUTO: 63.8 % — SIGNIFICANT CHANGE UP (ref 43–77)
NRBC # BLD: 2 /100 WBCS — HIGH (ref 0–0)
PLATELET # BLD AUTO: 379 K/UL — SIGNIFICANT CHANGE UP (ref 150–400)
POTASSIUM SERPL-MCNC: 3.4 MMOL/L — LOW (ref 3.5–5.3)
POTASSIUM SERPL-SCNC: 3.4 MMOL/L — LOW (ref 3.5–5.3)
PROT SERPL-MCNC: 7.5 G/DL — SIGNIFICANT CHANGE UP (ref 6–8.3)
RBC # BLD: 2.07 M/UL — LOW (ref 3.8–5.2)
RBC # BLD: 2.07 M/UL — LOW (ref 3.8–5.2)
RBC # FLD: 19.9 % — HIGH (ref 10.3–14.5)
RETICS #: 249 K/UL — HIGH (ref 25–125)
RETICS/RBC NFR: 12 % — HIGH (ref 0.5–2.5)
SODIUM SERPL-SCNC: 139 MMOL/L — SIGNIFICANT CHANGE UP (ref 135–145)
WBC # BLD: 14.49 K/UL — HIGH (ref 3.8–10.5)
WBC # FLD AUTO: 14.49 K/UL — HIGH (ref 3.8–10.5)

## 2020-05-12 PROCEDURE — 99232 SBSQ HOSP IP/OBS MODERATE 35: CPT

## 2020-05-12 PROCEDURE — 93306 TTE W/DOPPLER COMPLETE: CPT | Mod: 26

## 2020-05-12 RX ORDER — POTASSIUM CHLORIDE 20 MEQ
40 PACKET (EA) ORAL ONCE
Refills: 0 | Status: COMPLETED | OUTPATIENT
Start: 2020-05-12 | End: 2020-05-12

## 2020-05-12 RX ADMIN — TRAMADOL HYDROCHLORIDE 25 MILLIGRAM(S): 50 TABLET ORAL at 05:09

## 2020-05-12 RX ADMIN — ENOXAPARIN SODIUM 40 MILLIGRAM(S): 100 INJECTION SUBCUTANEOUS at 14:00

## 2020-05-12 RX ADMIN — HYDROMORPHONE HYDROCHLORIDE 2 MILLIGRAM(S): 2 INJECTION INTRAMUSCULAR; INTRAVENOUS; SUBCUTANEOUS at 12:36

## 2020-05-12 RX ADMIN — Medication 40 MILLIEQUIVALENT(S): at 12:26

## 2020-05-12 RX ADMIN — GABAPENTIN 300 MILLIGRAM(S): 400 CAPSULE ORAL at 23:23

## 2020-05-12 RX ADMIN — HYDROMORPHONE HYDROCHLORIDE 2 MILLIGRAM(S): 2 INJECTION INTRAMUSCULAR; INTRAVENOUS; SUBCUTANEOUS at 22:12

## 2020-05-12 RX ADMIN — TRAMADOL HYDROCHLORIDE 25 MILLIGRAM(S): 50 TABLET ORAL at 18:04

## 2020-05-12 RX ADMIN — TRAMADOL HYDROCHLORIDE 25 MILLIGRAM(S): 50 TABLET ORAL at 12:26

## 2020-05-12 RX ADMIN — SENNA PLUS 2 TABLET(S): 8.6 TABLET ORAL at 22:12

## 2020-05-12 RX ADMIN — TRAMADOL HYDROCHLORIDE 25 MILLIGRAM(S): 50 TABLET ORAL at 00:10

## 2020-05-12 RX ADMIN — HYDROMORPHONE HYDROCHLORIDE 2 MILLIGRAM(S): 2 INJECTION INTRAMUSCULAR; INTRAVENOUS; SUBCUTANEOUS at 08:25

## 2020-05-12 RX ADMIN — TRAMADOL HYDROCHLORIDE 25 MILLIGRAM(S): 50 TABLET ORAL at 23:23

## 2020-05-12 RX ADMIN — GABAPENTIN 300 MILLIGRAM(S): 400 CAPSULE ORAL at 16:42

## 2020-05-12 RX ADMIN — HYDROMORPHONE HYDROCHLORIDE 2 MILLIGRAM(S): 2 INJECTION INTRAMUSCULAR; INTRAVENOUS; SUBCUTANEOUS at 02:19

## 2020-05-12 RX ADMIN — HYDROMORPHONE HYDROCHLORIDE 2 MILLIGRAM(S): 2 INJECTION INTRAMUSCULAR; INTRAVENOUS; SUBCUTANEOUS at 16:43

## 2020-05-12 RX ADMIN — SODIUM CHLORIDE 100 MILLILITER(S): 9 INJECTION, SOLUTION INTRAVENOUS at 22:15

## 2020-05-12 RX ADMIN — GABAPENTIN 300 MILLIGRAM(S): 400 CAPSULE ORAL at 05:09

## 2020-05-12 NOTE — DISCHARGE NOTE PROVIDER - NSDCCPTREATMENT_GEN_ALL_CORE_FT
PRINCIPAL PROCEDURE  Procedure: TTE (transthoracic echocardiography)  Findings and Treatment: You had an echocardiogram (transthoracic echo) performed during hospitalization to determine your heart size and function, which was completely normal

## 2020-05-12 NOTE — CONSULT NOTE ADULT - SUBJECTIVE AND OBJECTIVE BOX
Patient is a 44y old  Female who presents with a chief complaint of Back and rib pain (12 May 2020 10:25)      HPI:  Patient is a 44/F with PMHx significant for Sickle cell disease followed by Dr. Roberto Huffman at [facility], known history of acute chest syndrome in the past, known history of avascular necrosis of the left hip, history of TRALI after transfusion requiring transfer to tertiary care, history of exchange transfusion 10 years prior, has sickle cell pain crises about 2 times per year and overall maintained on percocet BID. Presented on 20 with increased pain 10/10 mainly in ribs and back and admitted for further management and treatment. She was started on high dose pain medications, gentle hydration and oxygen. She denies any associated chest pain, SOB, n/v/d, abdominal pain, changes in vision, weakness, sick contacts, recent travel. However she does work as a medical assistant at [employer].     ROS:  Negative except for: pain in left ribs and back    PAST MEDICAL & SURGICAL HISTORY:  Avascular necrosis: left hip  Cellulitis left arm resolved 2010  Cerebral Aneurysm  Migraines  Sickle Cell Anemia  H/O:   S/P cholecystectomy      SOCIAL HISTORY:  works as medical assistant  denies tobacco/ETOH     FAMILY HISTORY:  FH: aneurysm: father  FH: lung cancer  FH: brain cancer  FH: breast cancer      MEDICATIONS  (STANDING):  enoxaparin Injectable 40 milliGRAM(s) SubCutaneous daily  gabapentin 300 milliGRAM(s) Oral three times a day  senna 2 Tablet(s) Oral at bedtime  sodium chloride 0.45%. 1000 milliLiter(s) (100 mL/Hr) IV Continuous <Continuous>  traMADol 25 milliGRAM(s) Oral every 6 hours    MEDICATIONS  (PRN):  HYDROmorphone  Injectable 1 milliGRAM(s) IV Push every 4 hours PRN Moderate Pain (4 - 6)  HYDROmorphone  Injectable 2 milliGRAM(s) IV Push every 4 hours PRN Severe Pain (7 - 10)  ondansetron Injectable 4 milliGRAM(s) IV Push every 6 hours PRN Nausea  oxycodone    5 mG/acetaminophen 325 mG 1 Tablet(s) Oral every 6 hours PRN Mild Pain (1 - 3)  polyethylene glycol 3350 17 Gram(s) Oral daily PRN Constipation      Allergies    Ceftin (Anaphylaxis)  Toradol (Anaphylaxis)    Intolerances        Vital Signs Last 24 Hrs  T(C): 36.9 (12 May 2020 19:52), Max: 37.7 (11 May 2020 21:20)  T(F): 98.5 (12 May 2020 19:52), Max: 99.8 (11 May 2020 21:20)  HR: 90 (12 May 2020 19:52) (79 - 90)  BP: 128/80 (12 May 2020 19:52) (113/72 - 156/81)  RR: 18 (12 May 2020 19:52) (18 - 19)  SpO2: 97% (12 May 2020 19:52) (94% - 99%)    PHYSICAL EXAM  General: adult in NAD  HEENT: clear oropharynx, anicteric sclera, pink conjunctivae  Neck: supple  CV: normal S1S2 with no murmur rubs or gallops  Lungs: clear to auscultation, no wheezes, no rhales  Abdomen: soft non-tender non-distended, no hepato/splenomegaly  Ext: no clubbing cyanosis or edema  Skin: no rashes and no petichiae  Neuro: alert and oriented X3 no focal deficits      LABS:    CBC Full  -  ( 12 May 2020 08:57 )  WBC Count : 14.49 K/uL  RBC Count : 2.07 M/uL  Hemoglobin : 7.0 g/dL  Hematocrit : 19.5 %  Platelet Count - Automated : 379 K/uL  Mean Cell Volume : 94.2 fl  Mean Cell Hemoglobin : 33.8 pg  Mean Cell Hemoglobin Concentration : 35.9 gm/dL  Auto Neutrophil # : 9.25 K/uL  Auto Lymphocyte # : 3.45 K/uL  Auto Monocyte # : 1.38 K/uL  Auto Eosinophil # : 0.17 K/uL  Auto Basophil # : 0.08 K/uL  Auto Neutrophil % : 63.8 %  Auto Lymphocyte % : 23.8 %  Auto Monocyte % : 9.5 %  Auto Eosinophil % : 1.2 %  Auto Basophil % : 0.6 %      Hemoglobin: 7.0 g/dL ( @ 08:57)  Hemoglobin: 7.9 g/dL ( @ 07:05)  Hemoglobin: 7.5 g/dL (05-10 @ 23:52)        139  |  104  |  4<L>  ----------------------------<  117<H>  3.4<L>   |  29  |  0.57    Ca    8.3<L>      12 May 2020 08:57  Phos  3.1     -  Mg     2.3         TPro  7.5  /  Alb  3.5  /  TBili  4.4<H>  /  DBili  x   /  AST  46<H>  /  ALT  23  /  AlkPhos  96      BLOOD SMEAR INTERPRETATION:    * RBC - normocytic, normochromic, + sickled cells, + few targeted RBC, + ovalocytes; + rare nuceated cells, + polychromasia  * WBC - neutrophils with normal morphology, small mature lymphs, no evidence of left shift  * Platelets - normal in number and morphology ; giant platelets noted      RADIOLOGY :  < from: Xray Chest 1 View- PORTABLE-Urgent (20 @ 00:15) >  Findings:  There is a prominent cardiac silhouette.    No lobar lung consolidation, pleural effusion or pneumothorax noted.    < end of copied text >

## 2020-05-12 NOTE — DISCHARGE NOTE PROVIDER - NSDCCPCAREPLAN_GEN_ALL_CORE_FT
PRINCIPAL DISCHARGE DIAGNOSIS  Diagnosis: Sickle cell crisis  Assessment and Plan of Treatment: You were admitted to the hospital for treatment of a sickle cell crisis  -Your pain was addressed and managed  -During hospitalization you did not require any blood transfusions, and your hemoglobin remained stable  -Be sure to follow with your outpatient Hematologist within 1 week of discharge from the hospital      SECONDARY DISCHARGE DIAGNOSES  Diagnosis: Cardiomegaly  Assessment and Plan of Treatment: You were noted to have what appeared to be an enlarged heart on chest Xray upon admission  -an echocardiogram (ECHO) was performed, a sonogram of the heart, to determine heart size and function, which was normal    Diagnosis: Avascular necrosis of bone of hip, left  Assessment and Plan of Treatment: For your avascular hip necrosis, continue on gabapentin three times a day and percocet for pain as needed  -Follow with your orthopedist per routine    Diagnosis: Cerebral Aneurysm  Assessment and Plan of Treatment: For your history of cerebral aneurysm, continue to monitor per routine  -Blood pressure control is important in managing this condition; your blood pressure was initially elevated during hospitalization likely due to the extreme discomfort you were experiencing, but returned to normal range  -Monitor blood pressure regularly, and follow a diet low in salt and saturated fats PRINCIPAL DISCHARGE DIAGNOSIS  Diagnosis: Sickle cell crisis  Assessment and Plan of Treatment: You were admitted to the hospital for treatment of a sickle cell crisis  -Your pain was addressed and managed  -During hospitalization you did not require any blood transfusions, and your hemoglobin remained stable  -Be sure to follow with your outpatient Hematologist within 1 week of discharge from the hospital. fu pmd dr dykes in 3 to 4 days . fu up repeat hb /hct in 3 to 4 days.      SECONDARY DISCHARGE DIAGNOSES  Diagnosis: Avascular necrosis of bone of hip, left  Assessment and Plan of Treatment: For your avascular hip necrosis, continue on gabapentin three times a day and percocet for pain as needed . also added on dialudid.   -Follow with your orthopedist per routine    Diagnosis: Cardiomegaly  Assessment and Plan of Treatment: You were noted to have what appeared to be an enlarged heart on chest Xray upon admission  -an echocardiogram (ECHO) was performed, a sonogram of the heart, to determine heart size and function, which was normal

## 2020-05-12 NOTE — DISCHARGE NOTE PROVIDER - NSDCMRMEDTOKEN_GEN_ALL_CORE_FT
gabapentin 300 mg oral tablet: 1 tab(s) orally 3 times a day  Percocet 10/325 oral tablet: 1 tab(s) orally 2 times a day, As Needed for hip pain  Prenatal Vitamins: gabapentin 300 mg oral tablet: 1 tab(s) orally 3 times a day  HYDROmorphone 2 mg oral tablet: 1 tab(s) orally every 6 hours, As needed, Severe Pain (7 - 10) MDD:4 tab  Percocet 10/325 oral tablet: 1 tab(s) orally 2 times a day, As Needed for hip pain  Prenatal Vitamins:

## 2020-05-12 NOTE — DISCHARGE NOTE PROVIDER - PROVIDER TOKENS
PROVIDER:[TOKEN:[[id number]],FOLLOWUP:[1 week],ESTABLISHEDPATIENT:[T]],PROVIDER:[TOKEN:[[id number]],FOLLOWUP:[1 week],ESTABLISHEDPATIENT:[T]]

## 2020-05-12 NOTE — DISCHARGE NOTE PROVIDER - CARE PROVIDER_API CALL
Roberto Medina  Hematology/Oncology  [address] [place] [address]  Phone: [phone]  Fax: [phone]  Established Patient  Follow Up Time: 1 week    Faisal Chery  [address] [address]  Phone: [phone]  Fax: [phone]  Established Patient  Follow Up Time: 1 week

## 2020-05-12 NOTE — PROGRESS NOTE ADULT - SUBJECTIVE AND OBJECTIVE BOX
CHIEF COMPLAINT/INTERVAL HISTORY:  Pt. seen and evaluated for sickle cell crisis.  Pt. reports still having pain along left lateral rib cage and back (typical of her sickle cell crisis), but improving.      REVIEW OF SYSTEMS:  No fever, CP, SOB, or abdominal pain.     Vital Signs Last 24 Hrs  T(C): 37.4 (12 May 2020 05:11), Max: 37.8 (11 May 2020 19:56)  T(F): 99.3 (12 May 2020 05:11), Max: 100 (11 May 2020 19:56)  HR: 79 (12 May 2020 05:11) (79 - 88)  BP: 113/72 (12 May 2020 05:11) (113/72 - 144/79)  BP(mean): --  RR: 18 (12 May 2020 05:11) (8 - 18)  SpO2: 99% (12 May 2020 05:11) (94% - 100%)    PHYSICAL EXAM:  GENERAL: NAD  HEENT: EOMI, hearing normal, conjunctiva and sclera clear  Chest: CTA bilaterally, no wheezing  CV: S1S2, RRR,   GI: soft, +BS, NT/ND  Musculoskeletal: no edema  Psychiatric: affect nL, mood nL  Skin: warm and dry    LABS:                        7.9    17.62 )-----------( 447      ( 11 May 2020 07:05 )             22.4     05-11    141  |  110<H>  |  5<L>  ----------------------------<  135<H>  3.8   |  24  |  0.64    Ca    8.3<L>      11 May 2020 07:05  Phos  3.1     05-11  Mg     2.2     05-11    TPro  7.9  /  Alb  3.8  /  TBili  2.3<H>  /  DBili  x   /  AST  49<H>  /  ALT  28  /  AlkPhos  89  05-11          Assessment and Plan:  -Sickle cell crisis:  continue analgesics and 1/2NS@100cc/hr.  Hematology consult.   -Cardiomegaly:  Check echo.    -Hx of AVN of left hip:  Continue Neurontin 300mg PO TID and rest of pain medications   -Hx of Migraine HA and Cerebral aneurysm:  Currently no headache.  Will continue to monitor.   -VTE ppx:  Lovenox 40mg SQ daily

## 2020-05-13 LAB
ALBUMIN SERPL ELPH-MCNC: 3.3 G/DL — SIGNIFICANT CHANGE UP (ref 3.3–5)
ALP SERPL-CCNC: 90 U/L — SIGNIFICANT CHANGE UP (ref 40–120)
ALT FLD-CCNC: 23 U/L — SIGNIFICANT CHANGE UP (ref 12–78)
ANION GAP SERPL CALC-SCNC: 7 MMOL/L — SIGNIFICANT CHANGE UP (ref 5–17)
AST SERPL-CCNC: 37 U/L — SIGNIFICANT CHANGE UP (ref 15–37)
BASOPHILS # BLD AUTO: 0 K/UL — SIGNIFICANT CHANGE UP (ref 0–0.2)
BASOPHILS NFR BLD AUTO: 0 % — SIGNIFICANT CHANGE UP (ref 0–2)
BILIRUB SERPL-MCNC: 3.2 MG/DL — HIGH (ref 0.2–1.2)
BUN SERPL-MCNC: 4 MG/DL — LOW (ref 7–23)
CALCIUM SERPL-MCNC: 8.4 MG/DL — LOW (ref 8.5–10.1)
CHLORIDE SERPL-SCNC: 103 MMOL/L — SIGNIFICANT CHANGE UP (ref 96–108)
CO2 SERPL-SCNC: 29 MMOL/L — SIGNIFICANT CHANGE UP (ref 22–31)
CREAT SERPL-MCNC: 0.46 MG/DL — LOW (ref 0.5–1.3)
EOSINOPHIL # BLD AUTO: 0 K/UL — SIGNIFICANT CHANGE UP (ref 0–0.5)
EOSINOPHIL NFR BLD AUTO: 0 % — SIGNIFICANT CHANGE UP (ref 0–6)
GLUCOSE SERPL-MCNC: 106 MG/DL — HIGH (ref 70–99)
HAPTOGLOB SERPL-MCNC: <20 MG/DL — LOW (ref 34–200)
HCT VFR BLD CALC: 19.6 % — CRITICAL LOW (ref 34.5–45)
HCT VFR BLD CALC: 21.9 % — LOW (ref 34.5–45)
HGB BLD-MCNC: 6.9 G/DL — CRITICAL LOW (ref 11.5–15.5)
HGB BLD-MCNC: 7.8 G/DL — LOW (ref 11.5–15.5)
LDH SERPL L TO P-CCNC: 620 U/L — HIGH (ref 50–242)
LYMPHOCYTES # BLD AUTO: 22 % — SIGNIFICANT CHANGE UP (ref 13–44)
LYMPHOCYTES # BLD AUTO: 3.09 K/UL — SIGNIFICANT CHANGE UP (ref 1–3.3)
MCHC RBC-ENTMCNC: 33.2 PG — SIGNIFICANT CHANGE UP (ref 27–34)
MCHC RBC-ENTMCNC: 35.2 GM/DL — SIGNIFICANT CHANGE UP (ref 32–36)
MCV RBC AUTO: 94.2 FL — SIGNIFICANT CHANGE UP (ref 80–100)
MONOCYTES # BLD AUTO: 1.68 K/UL — HIGH (ref 0–0.9)
MONOCYTES NFR BLD AUTO: 12 % — SIGNIFICANT CHANGE UP (ref 2–14)
NEUTROPHILS # BLD AUTO: 9.27 K/UL — HIGH (ref 1.8–7.4)
NEUTROPHILS NFR BLD AUTO: 66 % — SIGNIFICANT CHANGE UP (ref 43–77)
NRBC # BLD: SIGNIFICANT CHANGE UP /100 WBCS (ref 0–0)
PLATELET # BLD AUTO: 408 K/UL — HIGH (ref 150–400)
POTASSIUM SERPL-MCNC: 3.2 MMOL/L — LOW (ref 3.5–5.3)
POTASSIUM SERPL-SCNC: 3.2 MMOL/L — LOW (ref 3.5–5.3)
PROT SERPL-MCNC: 7.2 G/DL — SIGNIFICANT CHANGE UP (ref 6–8.3)
RBC # BLD: 2.08 M/UL — LOW (ref 3.8–5.2)
RBC # BLD: 2.08 M/UL — LOW (ref 3.8–5.2)
RBC # FLD: 19 % — HIGH (ref 10.3–14.5)
RETICS #: 245 K/UL — HIGH (ref 25–125)
RETICS/RBC NFR: 11.8 % — HIGH (ref 0.5–2.5)
SODIUM SERPL-SCNC: 139 MMOL/L — SIGNIFICANT CHANGE UP (ref 135–145)
WBC # BLD: 14.04 K/UL — HIGH (ref 3.8–10.5)
WBC # FLD AUTO: 14.04 K/UL — HIGH (ref 3.8–10.5)

## 2020-05-13 PROCEDURE — 99233 SBSQ HOSP IP/OBS HIGH 50: CPT | Mod: GC

## 2020-05-13 PROCEDURE — 83020 HEMOGLOBIN ELECTROPHORESIS: CPT | Mod: 26

## 2020-05-13 RX ORDER — POTASSIUM CHLORIDE 20 MEQ
40 PACKET (EA) ORAL EVERY 4 HOURS
Refills: 0 | Status: COMPLETED | OUTPATIENT
Start: 2020-05-13 | End: 2020-05-13

## 2020-05-13 RX ADMIN — Medication 40 MILLIEQUIVALENT(S): at 11:57

## 2020-05-13 RX ADMIN — TRAMADOL HYDROCHLORIDE 25 MILLIGRAM(S): 50 TABLET ORAL at 17:33

## 2020-05-13 RX ADMIN — SODIUM CHLORIDE 100 MILLILITER(S): 9 INJECTION, SOLUTION INTRAVENOUS at 05:35

## 2020-05-13 RX ADMIN — TRAMADOL HYDROCHLORIDE 25 MILLIGRAM(S): 50 TABLET ORAL at 11:57

## 2020-05-13 RX ADMIN — HYDROMORPHONE HYDROCHLORIDE 2 MILLIGRAM(S): 2 INJECTION INTRAMUSCULAR; INTRAVENOUS; SUBCUTANEOUS at 10:01

## 2020-05-13 RX ADMIN — HYDROMORPHONE HYDROCHLORIDE 2 MILLIGRAM(S): 2 INJECTION INTRAMUSCULAR; INTRAVENOUS; SUBCUTANEOUS at 04:05

## 2020-05-13 RX ADMIN — GABAPENTIN 300 MILLIGRAM(S): 400 CAPSULE ORAL at 05:35

## 2020-05-13 RX ADMIN — GABAPENTIN 300 MILLIGRAM(S): 400 CAPSULE ORAL at 15:17

## 2020-05-13 RX ADMIN — HYDROMORPHONE HYDROCHLORIDE 2 MILLIGRAM(S): 2 INJECTION INTRAMUSCULAR; INTRAVENOUS; SUBCUTANEOUS at 20:51

## 2020-05-13 RX ADMIN — Medication 40 MILLIEQUIVALENT(S): at 17:33

## 2020-05-13 RX ADMIN — GABAPENTIN 300 MILLIGRAM(S): 400 CAPSULE ORAL at 21:59

## 2020-05-13 RX ADMIN — SENNA PLUS 2 TABLET(S): 8.6 TABLET ORAL at 21:58

## 2020-05-13 RX ADMIN — ENOXAPARIN SODIUM 40 MILLIGRAM(S): 100 INJECTION SUBCUTANEOUS at 11:58

## 2020-05-13 RX ADMIN — TRAMADOL HYDROCHLORIDE 25 MILLIGRAM(S): 50 TABLET ORAL at 05:35

## 2020-05-13 RX ADMIN — HYDROMORPHONE HYDROCHLORIDE 2 MILLIGRAM(S): 2 INJECTION INTRAMUSCULAR; INTRAVENOUS; SUBCUTANEOUS at 15:17

## 2020-05-13 NOTE — PROGRESS NOTE ADULT - PROBLEM SELECTOR PLAN 1
- pt with h/o sickle cell anemia with crisis about twice a year, unclear trigger in this instance  - reticulocyte count 15% on admission --> downtrending. Trend daily  - Daily Retic count. Hb electrophoresis pending, f/u results  -Gentle IVF 1/2 NS @100cc/hr  - Noted to have mild leukocytosis, without L shift, likely reactive to pain  - Per patient, baseline Hb ~8.0  - transfuse for hemogloblin <7 or if develops symptomatic anemia   - pain control with percocet 5-325 mild, Dilaudid 1mg moderate, Dilaudid 2mg severe, and standing tramadol 25mg q6h  - c/w warm compresses as needed  - Zofran prn  - /93 on admission: likely 2/2 crisis and pain -- will monitor  - incentive spirometer ordered   - of note states her outptient Hematologist Dr. Huffman told her she did not need hydroxyurea  - Heme/onc Dr. Gibbs following - trend daily reticulocyte count, continue hydration with 1/2 NS @ 100cc/hr - pt with h/o sickle cell anemia with crisis about twice a year, unclear trigger in this instance  - reticulocyte count 15% on admission --> downtrending. Trend daily  - Daily Retic count. Hb electrophoresis pending, f/u results  -Gentle IVF 1/2 NS @100cc/hr  - Noted to have mild leukocytosis, without L shift, likely reactive to pain  - Per patient, baseline Hb ~8.0  - transfuse for hemogloblin <7 or if develops symptomatic anemia   - pain control with percocet 5-325 mild, Dilaudid 1mg moderate, Dilaudid 2mg severe, and standing tramadol 25mg q6h  - c/w warm compresses as needed  - Zofran prn  - /93 on admission: likely 2/2 crisis and pain -- will monitor  - incentive spirometer ordered   - of note states her outptient Hematologist Dr. Huffman told her she did not need hydroxyurea  - Heme/onc Dr. Gibbs following.   trend daily reticulocyte count, continue hydration with 1/2 NS @ 100cc/hr

## 2020-05-13 NOTE — PROGRESS NOTE ADULT - PROBLEM SELECTOR PLAN 2
- CXR: lungs clear, lateral haziness likely overlying subq fat, enlarged heart despite AP film, when compared to previous AP film 2013.  - EKG: prelim NSR with borderline LVH criteria (>34yo and R aVL greater than or equal to 12)  - TTE performed, f/u results

## 2020-05-13 NOTE — PROGRESS NOTE ADULT - ASSESSMENT
43 yo woman with history of sickle cell disease reported, followed by Dr. Roberto Huffman at [facility], usually with 2 pain crises per year (last in 9/2019), not on hydrea and well managed usually with percocet BID, now admitted with pain crises. Patient has had acute chest syndrome in past and about 10 years prior required Exchange Transfusion.    - continue supportive care with oxygen, gentle hydration (1/2 NS at 100cc/hr)  - would transfuse to maintain Hg >7  - continue to monitor CBC, Retic, LDH daily  - Hg electrophoresis ordered and pending  - would check repeat CXR PA/Lateral   - pain control with Dilaudid as needed; If no improvement would consider Pain management service input.  - will follow with you

## 2020-05-13 NOTE — PROGRESS NOTE ADULT - PROBLEM SELECTOR PLAN 3
- pt with h/o of avascular necrosis   - takes percocet 2 times daily for pain(istop per pharmacy) -- will hold and continue with pain control as stated above  - c/w home gabapentin 300mg TID

## 2020-05-13 NOTE — PROGRESS NOTE ADULT - SUBJECTIVE AND OBJECTIVE BOX
Patient seen and examined;  Chart reviewed and events noted;   pain is better controlled today; having left sided chest/rib pain      MEDICATIONS  (STANDING):  enoxaparin Injectable 40 milliGRAM(s) SubCutaneous daily  gabapentin 300 milliGRAM(s) Oral three times a day  senna 2 Tablet(s) Oral at bedtime  sodium chloride 0.45%. 1000 milliLiter(s) (100 mL/Hr) IV Continuous <Continuous>  traMADol 25 milliGRAM(s) Oral every 6 hours    MEDICATIONS  (PRN):  HYDROmorphone  Injectable 1 milliGRAM(s) IV Push every 4 hours PRN Moderate Pain (4 - 6)  HYDROmorphone  Injectable 2 milliGRAM(s) IV Push every 4 hours PRN Severe Pain (7 - 10)  ondansetron Injectable 4 milliGRAM(s) IV Push every 6 hours PRN Nausea  oxycodone    5 mG/acetaminophen 325 mG 1 Tablet(s) Oral every 6 hours PRN Mild Pain (1 - 3)  polyethylene glycol 3350 17 Gram(s) Oral daily PRN Constipation      Vital Signs Last 24 Hrs  T(C): 37.4 (13 May 2020 14:02), Max: 38.7 (13 May 2020 04:21)  T(F): 99.4 (13 May 2020 14:02), Max: 101.6 (13 May 2020 04:21)  HR: 84 (13 May 2020 14:02) (84 - 92)  BP: 134/74 (13 May 2020 14:02) (126/71 - 134/74)  RR: 17 (13 May 2020 14:02) (17 - 18)  SpO2: 97% (13 May 2020 14:02) (95% - 97%)    PHYSICAL EXAM  General: adult in NAD  HEENT: clear oropharynx, anicteric sclera, pink conjunctivae  Neck: supple  CV: normal S1S2 with no murmur rubs or gallops  Lungs: clear to auscultation, no wheezes, no rhales  Abdomen: soft non-tender non-distended, no hepato/splenomegaly  Ext: no clubbing cyanosis or edema  Skin: no rashes and no petichiae  Neuro: alert and oriented X3 no focal deficits      LABS:                        7.8    x     )-----------( x        ( 13 May 2020 12:02 )             21.9     Hemoglobin: 7.8 g/dL (05-13 @ 12:02)  Hemoglobin: 6.9 g/dL (05-13 @ 07:24)  Hemoglobin: 7.0 g/dL (05-12 @ 08:57)  Hemoglobin: 7.9 g/dL (05-11 @ 07:05)  Hemoglobin: 7.5 g/dL (05-10 @ 23:52)    WBC Count: 14.04 K/uL (05-13 @ 07:24)  WBC Count: 14.49 K/uL (05-12 @ 08:57)  WBC Count: 17.62 K/uL (05-11 @ 07:05)  WBC Count: 16.33 K/uL (05-10 @ 23:52)    Platelet Count - Automated: 408 K/uL (05-13 @ 07:24)  Platelet Count - Automated: 379 K/uL (05-12 @ 08:57)  Platelet Count - Automated: 447 K/uL (05-11 @ 07:05)  Platelet Count - Automated: 473 K/uL (05-10 @ 23:52)    05-13    139  |  103  |  4<L>  ----------------------------<  106<H>  3.2<L>   |  29  |  0.46<L>    Ca    8.4<L>      13 May 2020 07:24  Mg     2.3     05-12    TPro  7.2  /  Alb  3.3  /  TBili  3.2<H>  /  DBili  x   /  AST  37  /  ALT  23  /  AlkPhos  90  05-13

## 2020-05-13 NOTE — PROGRESS NOTE ADULT - SUBJECTIVE AND OBJECTIVE BOX
This progress note was completed in part by resident acting as telephonic scribe during COVID-19 crisis. Physical exam was completed by attending physician, and findings below are those of the attending physician, and have been reviewed in detail.    Patient is a 44y old  Female who presents with a chief complaint of Back and rib pain (12 May 2020 20:42)      HPI:  Patient is a 44/F with PMHx significant for Sickle cell disease (last known crisis September 2019), cerebral aneurysm, migraine, and avascular necrosis who presented to the ED with sudden onset of back and rib pain at approximately 7pm on the day of admission. Patient had attempted to take percocet for pain relief at 8:30 without any improvement in symptoms. Pt has percocet prescribed for avascular necrosis and she normally takes is BID. Pt states this is how her sickle cell crisis always presents. States her current pain is 10/10. Admits to sickle cell crisis about twice a year that requires hospitalization. States her baseline hemoglobin is around 8 and she needs a transfusion about once a year for hemoglobin <7. She denies any associated chest pain, SOB, n/v/d, abdominal pain, changes in vision, weakness, sick contacts, recent travel. Admits to a little bit of a headache.    ED Vitals:   T 98.5 HR 89 /93 RR 18 SpO2 97% on RA  Labs: Leukocytosis WBC 16.33 with lymphocytic predominance, anemic Hb 7.5 (Baseline 8), reticulocyte % 15.0. CMP wnl. COVID 19 PCR pending  CXR: lungs clear, lateral haziness likely overlying subq fat, enlarged heart despite AP film, when compared to previous AP film 2013. ([facility] read)  EKG: prelim NSR with borderline LVH critera  In the ED Patient received 1L Ns bolus, dilaudid 0.5mg x 1, morphine 4mg x1, zofran 4mg x 1 (11 May 2020 00:40)      INTERVAL HPI/OVERNIGHT EVENTS: Patient was seen and evaluated at the bedside. Still complaining of left sided rib pain. VS and labs reviewed. Remained afebrile overnight, hemodynamically stable. Pain better controlled.       T(C): 38.7 (05-13-20 @ 04:21), Max: 38.7 (05-13-20 @ 04:21)  HR: 92 (05-13-20 @ 04:21) (87 - 92)  BP: 126/71 (05-13-20 @ 04:21) (126/71 - 156/81)  RR: 18 (05-13-20 @ 04:21) (18 - 19)  SpO2: 95% (05-13-20 @ 04:21) (94% - 98%)  Wt(kg): --  I&O's Summary    12 May 2020 07:01  -  13 May 2020 07:00  --------------------------------------------------------  IN: 1100 mL / OUT: 600 mL / NET: 500 mL        REVIEW OF SYSTEMS:  CONSTITUTIONAL: No fever, weight loss, or fatigue  EYES: No eye pain, visual disturbances, or discharge  ENMT:  No difficulty hearing, tinnitus, vertigo; No sinus or throat pain  NECK: No pain or stiffness  BREASTS: No pain, no masses,   RESPIRATORY: No cough, wheezing, chills or hemoptysis; No shortness of breath  CARDIOVASCULAR: No chest pain, palpitations, dizziness, or leg swelling  GASTROINTESTINAL: No abdominal or epigastric pain. No nausea, vomiting, or hematemesis; No diarrhea or constipation. No melena or hematochezia.  GENITOURINARY: No dysuria, frequency, hematuria, or incontinence  NEUROLOGICAL: No headaches, memory loss, loss of strength, numbness, or tremors  SKIN: No itching, burning, rashes, or lesions   LYMPH NODES: No enlarged glands  MUSCULOSKELETAL: No joint pain or swelling; No muscle, back, or extremity pain  PSYCHIATRIC: No depression, anxiety, mood swings, or difficulty sleeping  ALLERY No hives or eczema    PHYSICAL EXAM:  GENERAL: NAD, well-groomed, well-developed  HEAD:  Atraumatic, Normocephalic  EYES: EOMI, PERRLA, conjunctiva and sclera clear  ENMT: No tonsillar erythema, exudates, or enlargement; Moist mucous membranes, Good dentition, No lesions  NECK: Supple, No JVD, Normal thyroid  NERVOUS SYSTEM:  Alert & Oriented X3, Good concentration; Motor Strength 5/5 B/L upper and lower extremities; DTRs 2+ intact and symmetric  CHEST/LUNG: Clear to percussion bilaterally; No rales, rhonchi, wheezing, or rubs  HEART: Regular rate and rhythm; No murmurs, rubs, or gallops  ABDOMEN: Soft, Nontender, Nondistended; Bowel sounds present  EXTREMITIES:  2+ Peripheral Pulses, No clubbing, cyanosis, or edema  LYMPH: No lymphadenopathy noted  SKIN: No rashes or lesions    MEDICATIONS  (STANDING):  enoxaparin Injectable 40 milliGRAM(s) SubCutaneous daily  gabapentin 300 milliGRAM(s) Oral three times a day  potassium chloride    Tablet ER 40 milliEquivalent(s) Oral every 4 hours  senna 2 Tablet(s) Oral at bedtime  sodium chloride 0.45%. 1000 milliLiter(s) (100 mL/Hr) IV Continuous <Continuous>  traMADol 25 milliGRAM(s) Oral every 6 hours    MEDICATIONS  (PRN):  HYDROmorphone  Injectable 1 milliGRAM(s) IV Push every 4 hours PRN Moderate Pain (4 - 6)  HYDROmorphone  Injectable 2 milliGRAM(s) IV Push every 4 hours PRN Severe Pain (7 - 10)  ondansetron Injectable 4 milliGRAM(s) IV Push every 6 hours PRN Nausea  oxycodone    5 mG/acetaminophen 325 mG 1 Tablet(s) Oral every 6 hours PRN Mild Pain (1 - 3)  polyethylene glycol 3350 17 Gram(s) Oral daily PRN Constipation      LABS:                        6.9    14.04 )-----------( 408      ( 13 May 2020 07:24 )             19.6     05-13    139  |  103  |  4<L>  ----------------------------<  106<H>  3.2<L>   |  29  |  0.46<L>    Ca    8.4<L>      13 May 2020 07:24  Mg     2.3     05-12    TPro  7.2  /  Alb  3.3  /  TBili  3.2<H>  /  DBili  x   /  AST  37  /  ALT  23  /  AlkPhos  90  05-13        CAPILLARY BLOOD GLUCOSE      RADIOLOGY & ADDITIONAL TESTS: No new imaging    Imaging Personally Reviewed:  yes      Advance Directives: Full code      Palliative Care: n/a This progress note was completed in part by resident acting as telephonic scribe during COVID-19 crisis. Physical exam was completed by attending physician, and findings below are those of the attending physician, and have been reviewed in detail.    Patient is a 44y old  Female who presents with a chief complaint of Back and rib pain (12 May 2020 20:42)      HPI:  Patient is a 44/F with PMHx significant for Sickle cell disease (last known crisis September 2019), cerebral aneurysm, migraine, and avascular necrosis who presented to the ED with sudden onset of back and rib pain at approximately 7pm on the day of admission. Patient had attempted to take percocet for pain relief at 8:30 without any improvement in symptoms. Pt has percocet prescribed for avascular necrosis and she normally takes is BID. Pt states this is how her sickle cell crisis always presents. States her current pain is 10/10. Admits to sickle cell crisis about twice a year that requires hospitalization. States her baseline hemoglobin is around 8 and she needs a transfusion about once a year for hemoglobin <7. She denies any associated chest pain, SOB, n/v/d, abdominal pain, changes in vision, weakness, sick contacts, recent travel. Admits to a little bit of a headache.    admission, found to be in sickle cell crisis-   INTERVAL HPI/OVERNIGHT EVENTS: Patient was seen and evaluated at the bedside. pt feeling better no further rib pain , no distress    Remained afebrile overnight, hemodynamically stable , Pain better controlled.       T(C): 38.7 (05-13-20 @ 04:21), Max: 38.7 (05-13-20 @ 04:21)  HR: 92 (05-13-20 @ 04:21) (87 - 92)  BP: 126/71 (05-13-20 @ 04:21) (126/71 - 156/81)  RR: 18 (05-13-20 @ 04:21) (18 - 19)  SpO2: 95% (05-13-20 @ 04:21) (94% - 98%)  Wt(kg): --  I&O's Summary    12 May 2020 07:01  -  13 May 2020 07:00  --------------------------------------------------------  IN: 1100 mL / OUT: 600 mL / NET: 500 mL        REVIEW OF SYSTEMS:  CONSTITUTIONAL: No fever, weight loss, or fatigue  EYES: No eye pain, visual disturbances, or discharge  ENMT:  No difficulty hearing, tinnitus, vertigo;   No sinus or throat pain  NECK: No pain or stiffness  BREASTS: No pain, no masses,   RESPIRATORY: No cough, wheezing, chills  No shortness of breath  CARDIOVASCULAR: No chest pain, palpitations, dizziness, or leg swelling  GASTROINTESTINAL: No abdominal or epigastric pain.   No nausea, vomiting, or hematemesis; No diarrhea or constipation. No melena   GENITOURINARY: No dysuria, frequency, hematuria, or incontinence  NEUROLOGICAL: No headaches, memory loss, loss of strength, numbness, or tremors  SKIN: No itching, burning, rashes, or lesions   MUSCULOSKELETAL: No joint pain or swelling; No muscle, back, or extremity pain      PHYSICAL EXAM:  GENERAL: NAD, well-groomed, well-developed  HEAD:  Atraumatic, Normocephalic  EYES: EOMI, PERRLA, conjunctiva and sclera clear  ENMT: No tonsillar erythema, exudates, or enlargement;   Moist mucous membranes,  No lesions  NECK: Supple, No JVD,  NERVOUS SYSTEM:  Alert & Oriented X3, Good concentration;   Motor Strength 5/5 B/L upper and lower extremities; DTRs 2+ intact and symmetric  CHEST/LUNG:  percussion bilaterally; No rales, rhonchi, wheezing,   HEART: Regular rate and rhythm; No murmurs, no tachy   ABDOMEN: Soft, Nontender, Nondistended; Bowel sounds present  EXTREMITIES:  2+ Peripheral Pulses, No clubbing, cyanosis, or edema  SKIN: No rashes or lesions    MEDICATIONS  (STANDING):  enoxaparin Injectable 40 milliGRAM(s) SubCutaneous daily  gabapentin 300 milliGRAM(s) Oral three times a day  potassium chloride    Tablet ER 40 milliEquivalent(s) Oral every 4 hours  senna 2 Tablet(s) Oral at bedtime  sodium chloride 0.45%. 1000 milliLiter(s) (100 mL/Hr) IV Continuous <Continuous>  traMADol 25 milliGRAM(s) Oral every 6 hours    MEDICATIONS  (PRN):  HYDROmorphone  Injectable 1 milliGRAM(s) IV Push every 4 hours PRN Moderate Pain (4 - 6)  HYDROmorphone  Injectable 2 milliGRAM(s) IV Push every 4 hours PRN Severe Pain (7 - 10)  ondansetron Injectable 4 milliGRAM(s) IV Push every 6 hours PRN Nausea  oxycodone    5 mG/acetaminophen 325 mG 1 Tablet(s) Oral every 6 hours PRN Mild Pain (1 - 3)  polyethylene glycol 3350 17 Gram(s) Oral daily PRN Constipation      LABS:                        6.9    14.04 )-----------( 408      ( 13 May 2020 07:24 )             19.6     05-13    139  |  103  |  4<L>  ----------------------------<  106<H>  3.2<L>   |  29  |  0.46<L>    Ca    8.4<L>      13 May 2020 07:24  Mg     2.3     05-12    TPro  7.2  /  Alb  3.3  /  TBili  3.2<H>  /  DBili  x   /  AST  37  /  ALT  23  /  AlkPhos  90  05-13          RADIOLOGY & ADDITIONAL TESTS: No new imaging    Imaging Personally Reviewed:  yes      Advance Directives: Full code      Palliative Care: n/a

## 2020-05-14 LAB
ALBUMIN SERPL ELPH-MCNC: 3.4 G/DL — SIGNIFICANT CHANGE UP (ref 3.3–5)
ALP SERPL-CCNC: 90 U/L — SIGNIFICANT CHANGE UP (ref 40–120)
ALT FLD-CCNC: 22 U/L — SIGNIFICANT CHANGE UP (ref 12–78)
ANION GAP SERPL CALC-SCNC: 7 MMOL/L — SIGNIFICANT CHANGE UP (ref 5–17)
AST SERPL-CCNC: 31 U/L — SIGNIFICANT CHANGE UP (ref 15–37)
BASOPHILS # BLD AUTO: 0.07 K/UL — SIGNIFICANT CHANGE UP (ref 0–0.2)
BASOPHILS NFR BLD AUTO: 0.6 % — SIGNIFICANT CHANGE UP (ref 0–2)
BILIRUB SERPL-MCNC: 2.8 MG/DL — HIGH (ref 0.2–1.2)
BUN SERPL-MCNC: 5 MG/DL — LOW (ref 7–23)
CALCIUM SERPL-MCNC: 8.6 MG/DL — SIGNIFICANT CHANGE UP (ref 8.5–10.1)
CHLORIDE SERPL-SCNC: 105 MMOL/L — SIGNIFICANT CHANGE UP (ref 96–108)
CO2 SERPL-SCNC: 28 MMOL/L — SIGNIFICANT CHANGE UP (ref 22–31)
CREAT SERPL-MCNC: 0.53 MG/DL — SIGNIFICANT CHANGE UP (ref 0.5–1.3)
EOSINOPHIL # BLD AUTO: 0.62 K/UL — HIGH (ref 0–0.5)
EOSINOPHIL NFR BLD AUTO: 5.3 % — SIGNIFICANT CHANGE UP (ref 0–6)
GLUCOSE SERPL-MCNC: 112 MG/DL — HIGH (ref 70–99)
HCT VFR BLD CALC: 21.1 % — LOW (ref 34.5–45)
HGB BLD-MCNC: 7.4 G/DL — LOW (ref 11.5–15.5)
HGB S BLD QL: POSITIVE
IMM GRANULOCYTES NFR BLD AUTO: 0.7 % — SIGNIFICANT CHANGE UP (ref 0–1.5)
LDH SERPL L TO P-CCNC: 603 U/L — HIGH (ref 50–242)
LYMPHOCYTES # BLD AUTO: 3.9 K/UL — HIGH (ref 1–3.3)
LYMPHOCYTES # BLD AUTO: 33.2 % — SIGNIFICANT CHANGE UP (ref 13–44)
MCHC RBC-ENTMCNC: 32.6 PG — SIGNIFICANT CHANGE UP (ref 27–34)
MCHC RBC-ENTMCNC: 35.1 GM/DL — SIGNIFICANT CHANGE UP (ref 32–36)
MCV RBC AUTO: 93 FL — SIGNIFICANT CHANGE UP (ref 80–100)
MONOCYTES # BLD AUTO: 1.13 K/UL — HIGH (ref 0–0.9)
MONOCYTES NFR BLD AUTO: 9.6 % — SIGNIFICANT CHANGE UP (ref 2–14)
NEUTROPHILS # BLD AUTO: 5.95 K/UL — SIGNIFICANT CHANGE UP (ref 1.8–7.4)
NEUTROPHILS NFR BLD AUTO: 50.6 % — SIGNIFICANT CHANGE UP (ref 43–77)
NRBC # BLD: 0 /100 WBCS — SIGNIFICANT CHANGE UP (ref 0–0)
PLATELET # BLD AUTO: 412 K/UL — HIGH (ref 150–400)
POTASSIUM SERPL-MCNC: 3.5 MMOL/L — SIGNIFICANT CHANGE UP (ref 3.5–5.3)
POTASSIUM SERPL-SCNC: 3.5 MMOL/L — SIGNIFICANT CHANGE UP (ref 3.5–5.3)
PROT SERPL-MCNC: 7.6 G/DL — SIGNIFICANT CHANGE UP (ref 6–8.3)
RBC # BLD: 2.27 M/UL — LOW (ref 3.8–5.2)
RBC # BLD: 2.27 M/UL — LOW (ref 3.8–5.2)
RBC # FLD: 19.1 % — HIGH (ref 10.3–14.5)
RETICS #: 283.3 K/UL — HIGH (ref 25–125)
RETICS/RBC NFR: 12.5 % — HIGH (ref 0.5–2.5)
SODIUM SERPL-SCNC: 140 MMOL/L — SIGNIFICANT CHANGE UP (ref 135–145)
SOLUBILITY: POSITIVE
WBC # BLD: 11.75 K/UL — HIGH (ref 3.8–10.5)
WBC # FLD AUTO: 11.75 K/UL — HIGH (ref 3.8–10.5)

## 2020-05-14 PROCEDURE — 71046 X-RAY EXAM CHEST 2 VIEWS: CPT | Mod: 26

## 2020-05-14 PROCEDURE — 99233 SBSQ HOSP IP/OBS HIGH 50: CPT | Mod: GC

## 2020-05-14 RX ORDER — OXYCODONE HYDROCHLORIDE 5 MG/1
10 TABLET ORAL EVERY 6 HOURS
Refills: 0 | Status: DISCONTINUED | OUTPATIENT
Start: 2020-05-14 | End: 2020-05-15

## 2020-05-14 RX ADMIN — GABAPENTIN 300 MILLIGRAM(S): 400 CAPSULE ORAL at 13:42

## 2020-05-14 RX ADMIN — OXYCODONE HYDROCHLORIDE 10 MILLIGRAM(S): 5 TABLET ORAL at 14:40

## 2020-05-14 RX ADMIN — TRAMADOL HYDROCHLORIDE 25 MILLIGRAM(S): 50 TABLET ORAL at 00:58

## 2020-05-14 RX ADMIN — SODIUM CHLORIDE 100 MILLILITER(S): 9 INJECTION, SOLUTION INTRAVENOUS at 05:42

## 2020-05-14 RX ADMIN — HYDROMORPHONE HYDROCHLORIDE 2 MILLIGRAM(S): 2 INJECTION INTRAMUSCULAR; INTRAVENOUS; SUBCUTANEOUS at 13:41

## 2020-05-14 RX ADMIN — HYDROMORPHONE HYDROCHLORIDE 2 MILLIGRAM(S): 2 INJECTION INTRAMUSCULAR; INTRAVENOUS; SUBCUTANEOUS at 04:32

## 2020-05-14 RX ADMIN — HYDROMORPHONE HYDROCHLORIDE 2 MILLIGRAM(S): 2 INJECTION INTRAMUSCULAR; INTRAVENOUS; SUBCUTANEOUS at 08:44

## 2020-05-14 RX ADMIN — GABAPENTIN 300 MILLIGRAM(S): 400 CAPSULE ORAL at 21:41

## 2020-05-14 RX ADMIN — ENOXAPARIN SODIUM 40 MILLIGRAM(S): 100 INJECTION SUBCUTANEOUS at 11:47

## 2020-05-14 RX ADMIN — SENNA PLUS 2 TABLET(S): 8.6 TABLET ORAL at 21:41

## 2020-05-14 RX ADMIN — HYDROMORPHONE HYDROCHLORIDE 2 MILLIGRAM(S): 2 INJECTION INTRAMUSCULAR; INTRAVENOUS; SUBCUTANEOUS at 18:31

## 2020-05-14 RX ADMIN — GABAPENTIN 300 MILLIGRAM(S): 400 CAPSULE ORAL at 05:43

## 2020-05-14 RX ADMIN — TRAMADOL HYDROCHLORIDE 25 MILLIGRAM(S): 50 TABLET ORAL at 05:43

## 2020-05-14 NOTE — PROGRESS NOTE ADULT - PROBLEM SELECTOR PLAN 1
- pt with h/o sickle cell anemia with crisis about twice a year, unclear trigger in this instance  - reticulocyte count 15% on admission   - Daily Retic count. Hb electrophoresis pending, f/u results  -Gentle IVF 1/2 NS @100cc/hr  - Noted to have mild leukocytosis, without L shift, likely reactive to pain  - Per patient, baseline Hb ~8.0  - transfuse for hemogloblin <7 or if develops symptomatic anemia   - pain control with percocet 5-325 mild, Dilaudid 1mg moderate, Dilaudid 2mg severe,   - c/w warm compresses as needed  - Zofran prn  - /93 on admission: likely 2/2 crisis and pain -- will monitor  - incentive spirometer ordered   - of note states her outptient Hematologist Dr. Huffman told her she did not need hydroxyurea  - Heme/onc Dr. Gibbs following.   12 .5  reticulocyte count, continue hydration with 1/2 NS @ 100cc/hr

## 2020-05-14 NOTE — PROGRESS NOTE ADULT - ASSESSMENT
45 yo woman with history of sickle cell disease reported, followed by Dr. Roberto Huffman at [facility], usually with 2 pain crises per year (last in 9/2019), not on hydrea and well managed usually with percocet BID as outpatient, admitted on 5/11/20 with pain crises. Patient has had acute chest syndrome in past and about 10 years prior required [facility] Transfusion.    - continue supportive care with oxygen, received gentle hydration (1/2 NS at 100cc/hr) but noted to have increased infiltrate on CXR and hydration held; clinically improved but still having some left sided chest pain  - would transfuse to maintain Hg >7  - continue to monitor CBC, Retic, LDH daily  - Hg electrophoresis ordered and pending- pain control with Dilaudid as needed; If no improvement would consider Pain management service input.  - will follow with you

## 2020-05-14 NOTE — PROGRESS NOTE ADULT - ATTENDING COMMENTS
pt seen and examine see above result - 44 f with sickle cell crisis - on iv  100 cc/hr hydration , possible dilutional anemia / repeat hb /hct  remain  above 7   , continue pain meds Dilaudid   with add on 10 mg  oxycodone as pt state work better for her. dc planning 24hr.
pt seen and examine see above result - 44 f with sickle cell crisis - on iv hydration , possible dilutional anemia / repeat hb /hct if still less than 7 give unit  1  unit prbc  , continue pain meds Dilaudid .

## 2020-05-14 NOTE — PROGRESS NOTE ADULT - SUBJECTIVE AND OBJECTIVE BOX
Patient seen and examined;  Chart reviewed and events noted;   feeling better but continues to have severe pain in left chest; had repeat CXR this morning with increased interstitial markings      MEDICATIONS  (STANDING):  enoxaparin Injectable 40 milliGRAM(s) SubCutaneous daily  gabapentin 300 milliGRAM(s) Oral three times a day  senna 2 Tablet(s) Oral at bedtime    MEDICATIONS  (PRN):  HYDROmorphone  Injectable 2 milliGRAM(s) IV Push every 4 hours PRN Severe Pain (7 - 10)  ondansetron Injectable 4 milliGRAM(s) IV Push every 6 hours PRN Nausea  oxyCODONE    IR 10 milliGRAM(s) Oral every 6 hours PRN Moderate Pain (4 - 6)  polyethylene glycol 3350 17 Gram(s) Oral daily PRN Constipation      Vital Signs Last 24 Hrs  T(C): 37.4 (14 May 2020 13:41), Max: 37.9 (13 May 2020 20:51)  T(F): 99.3 (14 May 2020 13:41), Max: 100.3 (13 May 2020 20:51)  HR: 79 (14 May 2020 13:41) (75 - 87)  BP: 114/76 (14 May 2020 13:41) (109/71 - 119/78)  RR: 17 (14 May 2020 13:41) (17 - 17)  SpO2: 97% (14 May 2020 13:41) (93% - 97%)    PHYSICAL EXAM  General: adult in NAD  HEENT: clear oropharynx, anicteric sclera, pink conjunctivae  Neck: supple  CV: normal S1S2 with no murmur rubs or gallops  Lungs: clear to auscultation, no wheezes, no rhales  Abdomen: soft non-tender non-distended, no hepato/splenomegaly  Ext: no clubbing cyanosis or edema  Skin: no rashes and no petichiae  Neuro: alert and oriented X3 no focal deficits      LABS:                        7.4    11.75 )-----------( 412      ( 14 May 2020 08:55 )             21.1     Hemoglobin: 7.4 g/dL (05-14 @ 08:55)  Hemoglobin: 7.8 g/dL (05-13 @ 12:02)  Hemoglobin: 6.9 g/dL (05-13 @ 07:24)  Hemoglobin: 7.0 g/dL (05-12 @ 08:57)  Hemoglobin: 7.9 g/dL (05-11 @ 07:05)    05-14    140  |  105  |  5<L>  ----------------------------<  112<H>  3.5   |  28  |  0.53    Ca    8.6      14 May 2020 08:55    TPro  7.6  /  Alb  3.4  /  TBili  2.8<H>  /  DBili  x   /  AST  31  /  ALT  22  /  AlkPhos  90  05-14    RADIOLOGY STUDIES:    Xray Chest 2 Views PA/Lat (05.14.20 @ 07:52)   PA and lateral views are submitted.     Comparison: 5/11/2020.    The heart size and mediastinum is within normal limits.    There is mild prominence of the bronchovascular markings at the lower lung zones bilaterally.  Minimal blunting at the left costophrenic angle may reflect trace pleural effusion.    Surgical clips are present right upper quadrant abdomen.

## 2020-05-14 NOTE — PROGRESS NOTE ADULT - PROBLEM SELECTOR PLAN 2
- CXR: lungs clear, lateral haziness likely overlying subq fat, enlarged heart despite AP film, when compared to previous AP film 2013.  - EKG: prelim NSR with borderline LVH criteria (>34yo and R aVL greater than or equal to 12)  - TTE performed - ef 65

## 2020-05-14 NOTE — PROGRESS NOTE ADULT - SUBJECTIVE AND OBJECTIVE BOX
Patient is a 44y old  Female who presents with a chief complaint of Back and rib pain (13 May 2020 19:08)      HPI:  Patient is a 44/F with PMHx significant for Sickle cell disease (last known crisis September 2019), cerebral aneurysm, migraine, and avascular necrosis who presented to the ED with sudden onset of back and rib pain at approximately 7pm on the day of admission. Patient had attempted to take percocet for pain relief at 8:30 without any improvement in symptoms. Pt has percocet prescribed for avascular necrosis and she normally takes is BID. Pt states this is how her sickle cell crisis always presents. States her current pain is 10/10. Admits to sickle cell crisis about twice a year that requires hospitalization. States her baseline hemoglobin is around 8 and she needs a transfusion about once a year for hemoglobin <7. She denies any associated chest pain, SOB, n/v/d, abdominal pain, changes in vision, weakness, sick contacts, recent travel. Admits to a little bit of a headache.  admission, found to be in sickle cell crisis-   INTERVAL HPI/OVERNIGHT EVENTS: Patient was seen and evaluated at the bedside. pt feeling better only   back pain today +  , no distress  , tolerating po .         INTERVAL HPI/OVERNIGHT EVENTS:  T(C): 37.1 (05-14-20 @ 05:27), Max: 37.9 (05-13-20 @ 20:51)  HR: 75 (05-14-20 @ 05:27) (75 - 87)  BP: 119/78 (05-14-20 @ 05:27) (109/71 - 134/74)  RR: 17 (05-14-20 @ 05:27) (17 - 17)  SpO2: 93% (05-14-20 @ 05:27) (93% - 97%)  Wt(kg): --  I&O's Summary    13 May 2020 07:01  -  14 May 2020 07:00  --------------------------------------------------------  IN: 900 mL / OUT: 0 mL / NET: 900 mL        REVIEW OF SYSTEMS:  CONSTITUTIONAL: No fever, weight loss, or fatigue  EYES: No eye pain, visual disturbances, or discharge  ENMT:  No sinus or throat pain  NECK: No pain or stiffness  BREASTS: No pain, no masses,  RESPIRATORY: No cough, wheezing, chills , No shortness of breath  CARDIOVASCULAR: No chest pain, palpitations, dizziness, or leg swelling  GASTROINTESTINAL: No abdominal or epigastric pain. No nausea, vomiting,    No diarrhea or constipation. No melena   GENITOURINARY: No dysuria, frequency, hematuria, or incontinence  NEUROLOGICAL: No headaches, memory loss, loss of strength, numbness, or tremors  SKIN: No itching, burning, rashes, or lesions   MUSCULOSKELETAL: No joint pain or swelling; No muscle, back pain + , no  extremity pain      PHYSICAL EXAM:  GENERAL: NAD, well-groomed, well-developed  HEAD:  Atraumatic, Normocephalic  EYES: EOMI, PERRLA, conjunctiva and sclera clear  ENMT:  Moist mucous membranes, No lesions  NECK: Supple,   NERVOUS SYSTEM:  Alert & Oriented X3,   Motor Strength 5/5 B/L upper and lower extremities; DTRs 2+ intact and symmetric  CHEST/LUNG: percussion bilaterally; No rales, rhonchi, wheezing,   HEART: Regular rate and rhythm; No murmurs, no tachy   ABDOMEN: Soft, Nontender, Nondistended; Bowel sounds present  EXTREMITIES:  2+ Peripheral Pulses, No clubbing, cyanosis, or edema , lower back mild tenderness paraspinal +   SKIN: No rashes or lesions    MEDICATIONS  (STANDING):  enoxaparin Injectable 40 milliGRAM(s) SubCutaneous daily  gabapentin 300 milliGRAM(s) Oral three times a day  senna 2 Tablet(s) Oral at bedtime  sodium chloride 0.45%. 1000 milliLiter(s) (100 mL/Hr) IV Continuous <Continuous>    MEDICATIONS  (PRN):  HYDROmorphone  Injectable 1 milliGRAM(s) IV Push every 4 hours PRN Moderate Pain (4 - 6)  HYDROmorphone  Injectable 2 milliGRAM(s) IV Push every 4 hours PRN Severe Pain (7 - 10)  ondansetron Injectable 4 milliGRAM(s) IV Push every 6 hours PRN Nausea  oxyCODONE    IR 10 milliGRAM(s) Oral every 6 hours PRN Moderate Pain (4 - 6)  polyethylene glycol 3350 17 Gram(s) Oral daily PRN Constipation      LABS:                        7.4    11.75 )-----------( 412      ( 14 May 2020 08:55 )             21.1     05-14    140  |  105  |  5<L>  ----------------------------<  112<H>  3.5   |  28  |  0.53    Ca    8.6      14 May 2020 08:55    TPro  7.6  /  Alb  3.4  /  TBili  2.8<H>  /  DBili  x   /  AST  31  /  ALT  22  /  AlkPhos  90  05-14                    RADIOLOGY & ADDITIONAL TESTS:    Imaging Personally Reviewed:     no new test   Advance Directives:  full code

## 2020-05-14 NOTE — PROGRESS NOTE ADULT - ASSESSMENT
Patient is a 44/F with PMHx significant for Sickle cell disease (last known crisis 2013), cerebral aneurysm, migraine d/o who presented to the ED with sudden onset of back and rib pain at approx 8pm on the day of admission, found to be in sickle cell crisis,

## 2020-05-15 ENCOUNTER — TRANSCRIPTION ENCOUNTER (OUTPATIENT)
Age: 45
End: 2020-05-15

## 2020-05-15 VITALS
HEART RATE: 74 BPM | SYSTOLIC BLOOD PRESSURE: 101 MMHG | WEIGHT: 227.96 LBS | TEMPERATURE: 98 F | DIASTOLIC BLOOD PRESSURE: 67 MMHG | OXYGEN SATURATION: 95 % | RESPIRATION RATE: 18 BRPM

## 2020-05-15 LAB
ANION GAP SERPL CALC-SCNC: 5 MMOL/L — SIGNIFICANT CHANGE UP (ref 5–17)
BASOPHILS # BLD AUTO: 0.07 K/UL — SIGNIFICANT CHANGE UP (ref 0–0.2)
BASOPHILS NFR BLD AUTO: 0.7 % — SIGNIFICANT CHANGE UP (ref 0–2)
BUN SERPL-MCNC: 5 MG/DL — LOW (ref 7–23)
CALCIUM SERPL-MCNC: 8.6 MG/DL — SIGNIFICANT CHANGE UP (ref 8.5–10.1)
CHLORIDE SERPL-SCNC: 106 MMOL/L — SIGNIFICANT CHANGE UP (ref 96–108)
CO2 SERPL-SCNC: 29 MMOL/L — SIGNIFICANT CHANGE UP (ref 22–31)
CREAT SERPL-MCNC: 0.43 MG/DL — LOW (ref 0.5–1.3)
EOSINOPHIL # BLD AUTO: 1.02 K/UL — HIGH (ref 0–0.5)
EOSINOPHIL NFR BLD AUTO: 10.4 % — HIGH (ref 0–6)
GLUCOSE SERPL-MCNC: 89 MG/DL — SIGNIFICANT CHANGE UP (ref 70–99)
HCT VFR BLD CALC: 19.7 % — CRITICAL LOW (ref 34.5–45)
HCT VFR BLD CALC: 20.4 % — CRITICAL LOW (ref 34.5–45)
HGB BLD-MCNC: 6.9 G/DL — CRITICAL LOW (ref 11.5–15.5)
HGB BLD-MCNC: 7.1 G/DL — LOW (ref 11.5–15.5)
IMM GRANULOCYTES NFR BLD AUTO: 0.7 % — SIGNIFICANT CHANGE UP (ref 0–1.5)
LDH SERPL L TO P-CCNC: 517 U/L — HIGH (ref 50–242)
LYMPHOCYTES # BLD AUTO: 4.65 K/UL — HIGH (ref 1–3.3)
LYMPHOCYTES # BLD AUTO: 47.4 % — HIGH (ref 13–44)
MCHC RBC-ENTMCNC: 32.1 PG — SIGNIFICANT CHANGE UP (ref 27–34)
MCHC RBC-ENTMCNC: 35 GM/DL — SIGNIFICANT CHANGE UP (ref 32–36)
MCV RBC AUTO: 91.6 FL — SIGNIFICANT CHANGE UP (ref 80–100)
MONOCYTES # BLD AUTO: 0.96 K/UL — HIGH (ref 0–0.9)
MONOCYTES NFR BLD AUTO: 9.8 % — SIGNIFICANT CHANGE UP (ref 2–14)
NEUTROPHILS # BLD AUTO: 3.05 K/UL — SIGNIFICANT CHANGE UP (ref 1.8–7.4)
NEUTROPHILS NFR BLD AUTO: 31 % — LOW (ref 43–77)
NRBC # BLD: 0 /100 WBCS — SIGNIFICANT CHANGE UP (ref 0–0)
PLATELET # BLD AUTO: 398 K/UL — SIGNIFICANT CHANGE UP (ref 150–400)
POTASSIUM SERPL-MCNC: 3.8 MMOL/L — SIGNIFICANT CHANGE UP (ref 3.5–5.3)
POTASSIUM SERPL-SCNC: 3.8 MMOL/L — SIGNIFICANT CHANGE UP (ref 3.5–5.3)
RBC # BLD: 2.15 M/UL — LOW (ref 3.8–5.2)
RBC # BLD: 2.15 M/UL — LOW (ref 3.8–5.2)
RBC # FLD: 19.1 % — HIGH (ref 10.3–14.5)
RETICS #: 262.5 K/UL — HIGH (ref 25–125)
RETICS/RBC NFR: 11.2 % — HIGH (ref 0.5–2.5)
SODIUM SERPL-SCNC: 140 MMOL/L — SIGNIFICANT CHANGE UP (ref 135–145)
WBC # BLD: 9.82 K/UL — SIGNIFICANT CHANGE UP (ref 3.8–10.5)
WBC # FLD AUTO: 9.82 K/UL — SIGNIFICANT CHANGE UP (ref 3.8–10.5)

## 2020-05-15 PROCEDURE — 96375 TX/PRO/DX INJ NEW DRUG ADDON: CPT

## 2020-05-15 PROCEDURE — 83735 ASSAY OF MAGNESIUM: CPT

## 2020-05-15 PROCEDURE — 86900 BLOOD TYPING SEROLOGIC ABO: CPT

## 2020-05-15 PROCEDURE — 86850 RBC ANTIBODY SCREEN: CPT

## 2020-05-15 PROCEDURE — 36415 COLL VENOUS BLD VENIPUNCTURE: CPT

## 2020-05-15 PROCEDURE — 80048 BASIC METABOLIC PNL TOTAL CA: CPT

## 2020-05-15 PROCEDURE — 85027 COMPLETE CBC AUTOMATED: CPT

## 2020-05-15 PROCEDURE — 83010 ASSAY OF HAPTOGLOBIN QUANT: CPT

## 2020-05-15 PROCEDURE — 99239 HOSP IP/OBS DSCHRG MGMT >30: CPT | Mod: GC

## 2020-05-15 PROCEDURE — 99053 MED SERV 10PM-8AM 24 HR FAC: CPT

## 2020-05-15 PROCEDURE — 85014 HEMATOCRIT: CPT

## 2020-05-15 PROCEDURE — 93005 ELECTROCARDIOGRAM TRACING: CPT

## 2020-05-15 PROCEDURE — 96361 HYDRATE IV INFUSION ADD-ON: CPT

## 2020-05-15 PROCEDURE — 85045 AUTOMATED RETICULOCYTE COUNT: CPT

## 2020-05-15 PROCEDURE — 86901 BLOOD TYPING SEROLOGIC RH(D): CPT

## 2020-05-15 PROCEDURE — 82728 ASSAY OF FERRITIN: CPT

## 2020-05-15 PROCEDURE — 83020 HEMOGLOBIN ELECTROPHORESIS: CPT

## 2020-05-15 PROCEDURE — 71046 X-RAY EXAM CHEST 2 VIEWS: CPT

## 2020-05-15 PROCEDURE — 96374 THER/PROPH/DIAG INJ IV PUSH: CPT

## 2020-05-15 PROCEDURE — 84100 ASSAY OF PHOSPHORUS: CPT

## 2020-05-15 PROCEDURE — 80053 COMPREHEN METABOLIC PANEL: CPT

## 2020-05-15 PROCEDURE — 71045 X-RAY EXAM CHEST 1 VIEW: CPT

## 2020-05-15 PROCEDURE — 85018 HEMOGLOBIN: CPT

## 2020-05-15 PROCEDURE — 99285 EMERGENCY DEPT VISIT HI MDM: CPT | Mod: 25

## 2020-05-15 PROCEDURE — 93306 TTE W/DOPPLER COMPLETE: CPT

## 2020-05-15 PROCEDURE — U0003: CPT

## 2020-05-15 PROCEDURE — 83615 LACTATE (LD) (LDH) ENZYME: CPT

## 2020-05-15 RX ORDER — HYDROMORPHONE HYDROCHLORIDE 2 MG/ML
1 INJECTION INTRAMUSCULAR; INTRAVENOUS; SUBCUTANEOUS
Qty: 20 | Refills: 0
Start: 2020-05-15 | End: 2020-05-19

## 2020-05-15 RX ORDER — HYDROMORPHONE HYDROCHLORIDE 2 MG/ML
2 INJECTION INTRAMUSCULAR; INTRAVENOUS; SUBCUTANEOUS EVERY 6 HOURS
Refills: 0 | Status: DISCONTINUED | OUTPATIENT
Start: 2020-05-15 | End: 2020-05-15

## 2020-05-15 RX ADMIN — GABAPENTIN 300 MILLIGRAM(S): 400 CAPSULE ORAL at 05:52

## 2020-05-15 RX ADMIN — OXYCODONE HYDROCHLORIDE 10 MILLIGRAM(S): 5 TABLET ORAL at 14:27

## 2020-05-15 RX ADMIN — GABAPENTIN 300 MILLIGRAM(S): 400 CAPSULE ORAL at 14:26

## 2020-05-15 RX ADMIN — HYDROMORPHONE HYDROCHLORIDE 2 MILLIGRAM(S): 2 INJECTION INTRAMUSCULAR; INTRAVENOUS; SUBCUTANEOUS at 06:48

## 2020-05-15 RX ADMIN — HYDROMORPHONE HYDROCHLORIDE 2 MILLIGRAM(S): 2 INJECTION INTRAMUSCULAR; INTRAVENOUS; SUBCUTANEOUS at 00:23

## 2020-05-15 NOTE — DISCHARGE NOTE NURSING/CASE MANAGEMENT/SOCIAL WORK - PATIENT PORTAL LINK FT
You can access the FollowMyHealth Patient Portal offered by [facility] by registering at the following website: [email or web address]. By joining [employer]’s FollowMyHealth portal, you will also be able to view your health information using other applications (apps) compatible with our system.
You can access the FollowMyHealth Patient Portal offered by [facility] by registering at the following website: [email or web address]. By joining [employer]’s FollowMyHealth portal, you will also be able to view your health information using other applications (apps) compatible with our system.

## 2020-05-15 NOTE — PROGRESS NOTE ADULT - REASON FOR ADMISSION
Back and rib pain

## 2020-05-15 NOTE — PROGRESS NOTE ADULT - SUBJECTIVE AND OBJECTIVE BOX
Patient is a 44y old  Female who presents with a chief complaint of Back and rib pain (14 May 2020 18:45)      HPI:  Patient is a 44/F with PMHx significant for Sickle cell disease (last known crisis September 2019), cerebral aneurysm, migraine, and avascular necrosis who presented to the ED with sudden onset of back and rib pain at approximately 7pm on the day of admission. Patient had attempted to take percocet for pain relief at 8:30 without any improvement in symptoms. Pt has percocet prescribed for avascular necrosis and she normally takes is BID. Pt states this is how her sickle cell crisis always presents. States her current pain is 10/10. Admits to sickle cell crisis about twice a year that requires hospitalization. States her baseline hemoglobin is around 8 and she needs a transfusion about once a year for hemoglobin <7. She denies any associated chest pain, SOB, n/v/d, abdominal pain, changes in vision, weakness, sick contacts, recent travel. Admits to a little bit of a headache.  admission, found to be in sickle cell crisis-     INTERVAL HPI/OVERNIGHT EVENTS: Patient was seen and evaluated at the bedside. pt feeling better only   back pain today +  , no distress  , tolerating po .     T(C): 36.7 (05-15-20 @ 05:56), Max: 37.4 (05-14-20 @ 13:41)  HR: 74 (05-15-20 @ 05:56) (74 - 87)  BP: 101/67 (05-15-20 @ 05:56) (101/64 - 114/76)  RR: 18 (05-15-20 @ 05:56) (17 - 18)  SpO2: 95% (05-15-20 @ 05:56) (95% - 97%)  Wt(kg): --  I&O's Summary    REVIEW OF SYSTEMS:  CONSTITUTIONAL: No fever, weight loss, or fatigue  EYES: No eye pain, visual disturbances, or discharge  ENMT:  No sinus or throat pain  NECK: No pain or stiffness  BREASTS: No pain, no masses,  RESPIRATORY: No cough, wheezing, chills , No shortness of breath  CARDIOVASCULAR: No chest pain, palpitations, dizziness, or leg swelling  GASTROINTESTINAL: No abdominal or epigastric pain. No nausea, vomiting,    No diarrhea or constipation. No melena   GENITOURINARY: No dysuria, frequency, hematuria, or incontinence  NEUROLOGICAL: No headaches, memory loss, loss of strength, numbness, or tremors  SKIN: No itching, burning, rashes, or lesions   MUSCULOSKELETAL: No joint pain or swelling; No muscle, back pain + , no  extremity pain    PHYSICAL EXAM:  GENERAL: NAD, well-groomed, well-developed  HEAD:  Atraumatic, Normocephalic  EYES: EOMI, PERRLA, conjunctiva and sclera clear  ENMT:  Moist mucous membranes, No lesions  NECK: Supple,   NERVOUS SYSTEM:  Alert & Oriented X3,   Motor Strength 5/5 B/L upper and lower extremities; DTRs 2+ intact and symmetric  CHEST/LUNG: percussion bilaterally; No rales, rhonchi, wheezing,   HEART: Regular rate and rhythm; No murmurs, no tachy   ABDOMEN: Soft, Nontender, Nondistended; Bowel sounds present  EXTREMITIES:  2+ Peripheral Pulses, No clubbing, cyanosis, or edema , lower back mild tenderness paraspinal +   SKIN: No rashes or lesions    MEDICATIONS  (STANDING):  enoxaparin Injectable 40 milliGRAM(s) SubCutaneous daily  gabapentin 300 milliGRAM(s) Oral three times a day  senna 2 Tablet(s) Oral at bedtime    MEDICATIONS  (PRN):  HYDROmorphone  Injectable 2 milliGRAM(s) IV Push every 4 hours PRN Severe Pain (7 - 10)  ondansetron Injectable 4 milliGRAM(s) IV Push every 6 hours PRN Nausea  oxyCODONE    IR 10 milliGRAM(s) Oral every 6 hours PRN Moderate Pain (4 - 6)  polyethylene glycol 3350 17 Gram(s) Oral daily PRN Constipation      LABS:                        6.9    9.82  )-----------( 398      ( 15 May 2020 06:44 )             19.7     05-15    140  |  106  |  5<L>  ----------------------------<  89  3.8   |  29  |  0.43<L>    Ca    8.6      15 May 2020 06:44    TPro  7.6  /  Alb  3.4  /  TBili  2.8<H>  /  DBili  x   /  AST  31  /  ALT  22  /  AlkPhos  90  05-14        CAPILLARY BLOOD GLUCOSE                  RADIOLOGY & ADDITIONAL TESTS:    Imaging Personally Reviewed:     no new test   Advance Directives:  full code

## 2020-05-18 LAB
HEMOGLOBIN INTERPRETATION: SIGNIFICANT CHANGE UP
HGB A MFR BLD: 0 % — LOW (ref 95.8–98)
HGB A2 MFR BLD: 2.9 % — SIGNIFICANT CHANGE UP (ref 2–3.2)
HGB F MFR BLD: 14.5 % — HIGH (ref 0–1)
HGB S MFR BLD: 82.6 % — HIGH

## 2024-07-16 ENCOUNTER — OFFICE (OUTPATIENT)
Dept: URBAN - METROPOLITAN AREA CLINIC 109 | Facility: CLINIC | Age: 49
Setting detail: OPHTHALMOLOGY
End: 2024-07-16
Payer: COMMERCIAL

## 2024-07-16 DIAGNOSIS — H43.393: ICD-10-CM

## 2024-07-16 DIAGNOSIS — H16.223: ICD-10-CM

## 2024-07-16 DIAGNOSIS — H40.013: ICD-10-CM

## 2024-07-16 PROCEDURE — 92134 CPTRZ OPH DX IMG PST SGM RTA: CPT | Performed by: OPHTHALMOLOGY

## 2024-07-16 PROCEDURE — 92250 FUNDUS PHOTOGRAPHY W/I&R: CPT | Performed by: OPHTHALMOLOGY

## 2024-07-16 PROCEDURE — 99203 OFFICE O/P NEW LOW 30 MIN: CPT | Performed by: OPHTHALMOLOGY

## 2024-07-16 ASSESSMENT — CONFRONTATIONAL VISUAL FIELD TEST (CVF)
OS_FINDINGS: FULL
OD_FINDINGS: FULL

## 2024-09-06 ENCOUNTER — OFFICE (OUTPATIENT)
Dept: URBAN - METROPOLITAN AREA CLINIC 109 | Facility: CLINIC | Age: 49
Setting detail: OPHTHALMOLOGY
End: 2024-09-06
Payer: COMMERCIAL

## 2024-09-06 DIAGNOSIS — H16.223: ICD-10-CM

## 2024-09-06 DIAGNOSIS — H40.013: ICD-10-CM

## 2024-09-06 PROCEDURE — 92083 EXTENDED VISUAL FIELD XM: CPT | Performed by: OPHTHALMOLOGY

## 2024-09-06 PROCEDURE — 99213 OFFICE O/P EST LOW 20 MIN: CPT | Performed by: OPHTHALMOLOGY

## 2024-09-06 PROCEDURE — 92020 GONIOSCOPY: CPT | Performed by: OPHTHALMOLOGY

## 2024-09-06 PROCEDURE — 92133 CPTRZD OPH DX IMG PST SGM ON: CPT | Performed by: OPHTHALMOLOGY

## 2024-09-06 ASSESSMENT — CONFRONTATIONAL VISUAL FIELD TEST (CVF)
OD_FINDINGS: FULL
OS_FINDINGS: FULL